# Patient Record
Sex: FEMALE | Race: WHITE | NOT HISPANIC OR LATINO | Employment: UNEMPLOYED | URBAN - METROPOLITAN AREA
[De-identification: names, ages, dates, MRNs, and addresses within clinical notes are randomized per-mention and may not be internally consistent; named-entity substitution may affect disease eponyms.]

---

## 2022-11-21 ENCOUNTER — OFFICE VISIT (OUTPATIENT)
Dept: FAMILY MEDICINE CLINIC | Facility: CLINIC | Age: 22
End: 2022-11-21

## 2022-11-21 ENCOUNTER — PATIENT ROUNDING (BHMG ONLY) (OUTPATIENT)
Dept: FAMILY MEDICINE CLINIC | Facility: CLINIC | Age: 22
End: 2022-11-21

## 2022-11-21 VITALS
DIASTOLIC BLOOD PRESSURE: 72 MMHG | WEIGHT: 164.2 LBS | BODY MASS INDEX: 28.03 KG/M2 | HEIGHT: 64 IN | SYSTOLIC BLOOD PRESSURE: 114 MMHG | OXYGEN SATURATION: 95 % | HEART RATE: 86 BPM

## 2022-11-21 DIAGNOSIS — H92.01 RIGHT EAR PAIN: ICD-10-CM

## 2022-11-21 DIAGNOSIS — J30.89 ENVIRONMENTAL AND SEASONAL ALLERGIES: Primary | ICD-10-CM

## 2022-11-21 PROCEDURE — 99203 OFFICE O/P NEW LOW 30 MIN: CPT | Performed by: NURSE PRACTITIONER

## 2022-11-21 RX ORDER — LORATADINE 10 MG/1
10 TABLET ORAL DAILY
Qty: 30 TABLET | Refills: 0 | Status: SHIPPED | OUTPATIENT
Start: 2022-11-21 | End: 2023-02-02

## 2022-11-21 RX ORDER — LORATADINE AND PSEUDOEPHEDRINE SULFATE 5; 120 MG/1; MG/1
1 TABLET, EXTENDED RELEASE ORAL 2 TIMES DAILY
Qty: 28 TABLET | Refills: 0 | Status: SHIPPED | OUTPATIENT
Start: 2022-11-21 | End: 2023-02-02

## 2022-11-21 NOTE — PROGRESS NOTES
"Chief Complaint  Earache (Pt states her left ear is huting her and she says it feels like a lot of pressure inside) and Labs Only    Subjective        Nataliia Lr presents to [facility]  History of Present Illness  Pt is here today to establish care. She reports she is having ear pain in left ear. She went to  last week and was advised there was fluid in her ear. Pt would also like to discuss general lab work. She is not fasting today. She will schedule annual physical and will come to [facility] fasting.     Objective   Vital Signs:  /72   Pulse 86   Ht 162.6 cm (64\")   Wt 74.5 kg (164 lb 3.2 oz)   SpO2 95%   BMI 28.18 kg/m²   Estimated body mass index is 28.18 kg/m² as calculated from the following:    Height as of this encounter: 162.6 cm (64\").    Weight as of this encounter: 74.5 kg (164 lb 3.2 oz).        Physical Exam  Vitals reviewed.   Constitutional:       Appearance: Normal appearance.   HENT:      Right Ear: Ear canal and external ear normal. A middle ear effusion is present.      Left Ear: Ear canal and external ear normal. A middle ear effusion is present.      Nose: Nose normal.      Mouth/Throat:      Mouth: Mucous membranes are moist.   Eyes:      Conjunctiva/sclera: Conjunctivae normal.   Cardiovascular:      Rate and Rhythm: Normal rate and regular rhythm.      Heart sounds: Normal heart sounds.   Pulmonary:      Effort: Pulmonary effort is normal.      Breath sounds: Normal breath sounds.   Musculoskeletal:         General: Normal range of motion.      Cervical back: Neck supple.   Skin:     General: Skin is warm.   Neurological:      Mental Status: She is alert and oriented to person, place, and time.   Psychiatric:         Mood and Affect: Mood normal.         Behavior: Behavior normal.         Thought Content: Thought content normal.        Result Review :                Assessment and Plan   Diagnoses and all orders for this visit:    1. Environmental " and seasonal allergies (Primary)  -     loratadine-pseudoephedrine (Claritin-D 12 Hour) 5-120 MG per 12 hr tablet; Take 1 tablet by mouth 2 (Two) Times a Day for 14 days.  Dispense: 28 tablet; Refill: 0  -     loratadine (Claritin) 10 MG tablet; Take 1 tablet by mouth Daily.  Dispense: 30 tablet; Refill: 0    2. Right ear pain     - Take Claritin-D for allergy relief and sinus/ear pressure. Do not take for extended period and do not take concurrently with claritin.        Follow Up   Return for Annual.  Patient was given instructions and counseling regarding her condition or for health maintenance advice. Please see specific information pulled into the AVS if appropriate.

## 2022-11-28 ENCOUNTER — LAB (OUTPATIENT)
Dept: LAB | Facility: HOSPITAL | Age: 22
End: 2022-11-28

## 2022-11-28 ENCOUNTER — OFFICE VISIT (OUTPATIENT)
Dept: FAMILY MEDICINE CLINIC | Facility: CLINIC | Age: 22
End: 2022-11-28

## 2022-11-28 VITALS
BODY MASS INDEX: 28.07 KG/M2 | SYSTOLIC BLOOD PRESSURE: 118 MMHG | DIASTOLIC BLOOD PRESSURE: 74 MMHG | WEIGHT: 164.4 LBS | OXYGEN SATURATION: 97 % | HEART RATE: 84 BPM | HEIGHT: 64 IN

## 2022-11-28 DIAGNOSIS — H92.02 OTALGIA OF LEFT EAR: ICD-10-CM

## 2022-11-28 DIAGNOSIS — Z86.39 HISTORY OF VITAMIN D DEFICIENCY: ICD-10-CM

## 2022-11-28 DIAGNOSIS — Z00.00 ANNUAL PHYSICAL EXAM: ICD-10-CM

## 2022-11-28 DIAGNOSIS — Z13.220 SCREENING FOR CHOLESTEROL LEVEL: ICD-10-CM

## 2022-11-28 DIAGNOSIS — Z13.29 SCREENING FOR THYROID DISORDER: ICD-10-CM

## 2022-11-28 DIAGNOSIS — Z00.00 ANNUAL PHYSICAL EXAM: Primary | ICD-10-CM

## 2022-11-28 DIAGNOSIS — Z13.0 SCREENING FOR DEFICIENCY ANEMIA: ICD-10-CM

## 2022-11-28 LAB
25(OH)D3 SERPL-MCNC: 28.4 NG/ML (ref 30–100)
ALBUMIN SERPL-MCNC: 4.5 G/DL (ref 3.5–5.2)
ALBUMIN/GLOB SERPL: 1.7 G/DL
ALP SERPL-CCNC: 54 U/L (ref 39–117)
ALT SERPL W P-5'-P-CCNC: 13 U/L (ref 1–33)
ANION GAP SERPL CALCULATED.3IONS-SCNC: 11.5 MMOL/L (ref 5–15)
AST SERPL-CCNC: 18 U/L (ref 1–32)
BASOPHILS # BLD AUTO: 0.03 10*3/MM3 (ref 0–0.2)
BASOPHILS NFR BLD AUTO: 0.5 % (ref 0–1.5)
BILIRUB SERPL-MCNC: 1.4 MG/DL (ref 0–1.2)
BUN SERPL-MCNC: 12 MG/DL (ref 6–20)
BUN/CREAT SERPL: 12.2 (ref 7–25)
CALCIUM SPEC-SCNC: 9.1 MG/DL (ref 8.6–10.5)
CHLORIDE SERPL-SCNC: 105 MMOL/L (ref 98–107)
CHOLEST SERPL-MCNC: 200 MG/DL (ref 0–200)
CO2 SERPL-SCNC: 25.5 MMOL/L (ref 22–29)
CREAT SERPL-MCNC: 0.98 MG/DL (ref 0.57–1)
DEPRECATED RDW RBC AUTO: 41.1 FL (ref 37–54)
EGFRCR SERPLBLD CKD-EPI 2021: 82.8 ML/MIN/1.73
EOSINOPHIL # BLD AUTO: 0.22 10*3/MM3 (ref 0–0.4)
EOSINOPHIL NFR BLD AUTO: 3.4 % (ref 0.3–6.2)
ERYTHROCYTE [DISTWIDTH] IN BLOOD BY AUTOMATED COUNT: 14.8 % (ref 12.3–15.4)
GLOBULIN UR ELPH-MCNC: 2.7 GM/DL
GLUCOSE SERPL-MCNC: 92 MG/DL (ref 65–99)
HCT VFR BLD AUTO: 36.9 % (ref 34–46.6)
HDLC SERPL-MCNC: 45 MG/DL (ref 40–60)
HGB BLD-MCNC: 11.4 G/DL (ref 12–15.9)
IMM GRANULOCYTES # BLD AUTO: 0.01 10*3/MM3 (ref 0–0.05)
IMM GRANULOCYTES NFR BLD AUTO: 0.2 % (ref 0–0.5)
LDLC SERPL CALC-MCNC: 138 MG/DL (ref 0–100)
LDLC/HDLC SERPL: 3.04 {RATIO}
LYMPHOCYTES # BLD AUTO: 1.98 10*3/MM3 (ref 0.7–3.1)
LYMPHOCYTES NFR BLD AUTO: 30.7 % (ref 19.6–45.3)
MCH RBC QN AUTO: 24 PG (ref 26.6–33)
MCHC RBC AUTO-ENTMCNC: 30.9 G/DL (ref 31.5–35.7)
MCV RBC AUTO: 77.7 FL (ref 79–97)
MONOCYTES # BLD AUTO: 0.7 10*3/MM3 (ref 0.1–0.9)
MONOCYTES NFR BLD AUTO: 10.9 % (ref 5–12)
NEUTROPHILS NFR BLD AUTO: 3.5 10*3/MM3 (ref 1.7–7)
NEUTROPHILS NFR BLD AUTO: 54.3 % (ref 42.7–76)
NRBC BLD AUTO-RTO: 0 /100 WBC (ref 0–0.2)
PLATELET # BLD AUTO: 189 10*3/MM3 (ref 140–450)
PMV BLD AUTO: 13.1 FL (ref 6–12)
POTASSIUM SERPL-SCNC: 4 MMOL/L (ref 3.5–5.2)
PROT SERPL-MCNC: 7.2 G/DL (ref 6–8.5)
RBC # BLD AUTO: 4.75 10*6/MM3 (ref 3.77–5.28)
SODIUM SERPL-SCNC: 142 MMOL/L (ref 136–145)
TRIGL SERPL-MCNC: 91 MG/DL (ref 0–150)
TSH SERPL DL<=0.05 MIU/L-ACNC: 2.9 UIU/ML (ref 0.27–4.2)
VLDLC SERPL-MCNC: 17 MG/DL (ref 5–40)
WBC NRBC COR # BLD: 6.44 10*3/MM3 (ref 3.4–10.8)

## 2022-11-28 PROCEDURE — 82306 VITAMIN D 25 HYDROXY: CPT

## 2022-11-28 PROCEDURE — 80050 GENERAL HEALTH PANEL: CPT

## 2022-11-28 PROCEDURE — 80061 LIPID PANEL: CPT

## 2022-11-28 PROCEDURE — 99395 PREV VISIT EST AGE 18-39: CPT | Performed by: NURSE PRACTITIONER

## 2022-11-28 PROCEDURE — 3008F BODY MASS INDEX DOCD: CPT | Performed by: NURSE PRACTITIONER

## 2022-11-28 PROCEDURE — 2014F MENTAL STATUS ASSESS: CPT | Performed by: NURSE PRACTITIONER

## 2022-11-28 NOTE — PROGRESS NOTES
"Chief Complaint  Annual Exam and Earache (Pt states her left ear is still hurting)    Subjective          Nataliia Lr presents to [facility] for   History of Present Illness  Pt is here today for annual physical. She has been dealing with ear pain for about 2-3 weeks. Initially we discussed a decongestant due to fluid pressure in the ears. Pt has been taking Claritin-D since last appt and has had almost no relief. She has also noticed decreased hearing in the left ear. Referring to ENT for evaluation.    Discussed immunizations. Pt declines flu vaccine. Pt thinks she had HPV vaccines as a teenager, but not sure. She is also not sure of last td. Will request immunization records from previous provider. She has never had a pap. She will contact a GYN and make an appointment to be seen. She is fasting today; will do yearly screening labs.     Earache   There is pain in the left ear. This is a new problem. The current episode started 1 to 4 weeks ago. The problem occurs constantly. The problem has been unchanged. There has been no fever. She has tried nothing (Claritin-D) for the symptoms. Her past medical history is significant for hearing loss. There is no history of a chronic ear infection or a tympanostomy tube.     Objective   Vital Signs:   Vitals:    11/28/22 0840   BP: 118/74   Pulse: 84   SpO2: 97%   Weight: 74.6 kg (164 lb 6.4 oz)   Height: 162.6 cm (64.02\")     Body mass index is 28.21 kg/m².    BMI is >= 25 and <30. (Overweight) The following options were offered after discussion;: exercise counseling/recommendations and nutrition counseling/recommendations    Physical Exam  Vitals reviewed.   Constitutional:       Appearance: Normal appearance.   HENT:      Right Ear: Ear canal and external ear normal. A middle ear effusion is present.      Left Ear: Ear canal and external ear normal. A middle ear effusion is present.      Nose: Nose normal.      Mouth/Throat:      Mouth: " Mucous membranes are moist.      Pharynx: Oropharynx is clear.   Eyes:      Conjunctiva/sclera: Conjunctivae normal.   Cardiovascular:      Rate and Rhythm: Regular rhythm.      Pulses: Normal pulses.      Heart sounds: Normal heart sounds.   Pulmonary:      Effort: Pulmonary effort is normal.      Breath sounds: Normal breath sounds.   Abdominal:      General: Abdomen is flat. Bowel sounds are normal.      Palpations: Abdomen is soft.      Tenderness: There is no abdominal tenderness. There is no guarding.   Musculoskeletal:         General: No tenderness. Normal range of motion.      Cervical back: Normal range of motion and neck supple.   Skin:     General: Skin is warm.   Neurological:      Mental Status: She is alert and oriented to person, place, and time.   Psychiatric:         Mood and Affect: Mood normal.         Behavior: Behavior normal.         Thought Content: Thought content normal.        Result Review :                  There is no immunization history on file for this patient.    Health Maintenance   Topic Date Due   • TDAP/TD VACCINES (1 - Tdap) Never done   • INFLUENZA VACCINE  Never done   • PAP SMEAR  Never done        Assessment and Plan    Diagnoses and all orders for this visit:    1. Annual physical exam (Primary)  -     Comprehensive Metabolic Panel; Future    2. Otalgia of left ear  -     Ambulatory Referral to ENT (Otolaryngology)    3. Screening for deficiency anemia  -     CBC Auto Differential; Future    4. History of vitamin D deficiency  -     Vitamin D,25-Hydroxy; Future    5. Screening for thyroid disorder  -     TSH Rfx On Abnormal To Free T4; Future    6. Screening for cholesterol level  -     Lipid Panel; Future        Counseling/anticipatory guidance: Nutrition, physical activity, healthy weight, dental health, mental health, eye exam, immunizations, screenings    Pt gets routine dental/vision exams. Discussed diet and exercise for healthy BMI.     Pt does not wish to get  vaccinations today. Discussed flu, covid, HPV, and td.       Follow Up   No follow-ups on file.  Patient was given instructions and counseling regarding her condition or for health maintenance advice. Please see specific information pulled into the AVS if appropriate.

## 2023-02-02 ENCOUNTER — OFFICE VISIT (OUTPATIENT)
Dept: FAMILY MEDICINE CLINIC | Facility: CLINIC | Age: 23
End: 2023-02-02
Payer: COMMERCIAL

## 2023-02-02 VITALS
WEIGHT: 163 LBS | DIASTOLIC BLOOD PRESSURE: 72 MMHG | HEIGHT: 64 IN | BODY MASS INDEX: 27.83 KG/M2 | SYSTOLIC BLOOD PRESSURE: 102 MMHG

## 2023-02-02 DIAGNOSIS — R22.32 MASS OF AXILLA, LEFT: Primary | ICD-10-CM

## 2023-02-02 PROCEDURE — 99213 OFFICE O/P EST LOW 20 MIN: CPT | Performed by: NURSE PRACTITIONER

## 2023-02-02 NOTE — PROGRESS NOTES
"Subjective   Nataliia Lr is a 23 y.o. female.   Chief Complaint   Patient presents with   • Mass     Left armpit       History of Present Illness   Patient of [facility] is here with complaint of mass under left arm, has been there for 4 years or so, not painful, but has become more concerned because her mother was recently diagnosed with breast cancer. States she does not like how it looks when she has sleeveless tops on  The following portions of the patient's history were reviewed and updated as appropriate: allergies, current medications, past family history, past medical history, past social history, past surgical history and problem list.    Review of Systems    Objective   Physical Exam  Vitals reviewed.   Constitutional:       General: She is not in acute distress.     Appearance: Normal appearance. She is not ill-appearing, toxic-appearing or diaphoretic.   HENT:      Head: Normocephalic and atraumatic.   Neck:      Vascular: No carotid bruit.   Cardiovascular:      Rate and Rhythm: Normal rate and regular rhythm.   Pulmonary:      Effort: Pulmonary effort is normal. No respiratory distress.      Breath sounds: Normal breath sounds.   Chest:   Breasts:     Right: No tenderness.      Left: No tenderness.       Lymphadenopathy:      Upper Body:      Right upper body: No supraclavicular, axillary or pectoral adenopathy.      Left upper body: No supraclavicular, axillary or pectoral adenopathy.   Neurological:      Mental Status: She is alert and oriented to person, place, and time.   Psychiatric:         Mood and Affect: Mood normal.         Thought Content: Thought content normal.       /72 (BP Location: Left arm, Patient Position: Sitting, Cuff Size: Adult)   Ht 162.6 cm (64.02\")   Wt 73.9 kg (163 lb)   BMI 27.96 kg/m²     Assessment & Plan   Diagnoses and all orders for this visit:    1. Mass of axilla, left (Primary)  -     US Axilla Left; Future        US of left axilla ordered; if lipoma, " can refer to surgeon to discuss removal if that is patient's wish. She is also aware that she may have to get a mammogram for further evaluation  Patient was encouraged to keep me informed of any acute changes, lack of improvement, or any new concerning symptoms.

## 2023-02-13 ENCOUNTER — HOSPITAL ENCOUNTER (OUTPATIENT)
Dept: ULTRASOUND IMAGING | Facility: HOSPITAL | Age: 23
Discharge: HOME | End: 2023-02-13
Admitting: NURSE PRACTITIONER
Payer: COMMERCIAL

## 2023-02-13 DIAGNOSIS — R22.32 MASS OF AXILLA, LEFT: ICD-10-CM

## 2023-02-13 PROCEDURE — 76882 US LMTD JT/FCL EVL NVASC XTR: CPT

## 2023-03-28 ENCOUNTER — OFFICE VISIT (OUTPATIENT)
Dept: FAMILY MEDICINE CLINIC | Facility: CLINIC | Age: 23
End: 2023-03-28
Payer: COMMERCIAL

## 2023-03-28 VITALS
WEIGHT: 166 LBS | SYSTOLIC BLOOD PRESSURE: 104 MMHG | DIASTOLIC BLOOD PRESSURE: 80 MMHG | HEIGHT: 64 IN | HEART RATE: 87 BPM | BODY MASS INDEX: 28.34 KG/M2 | OXYGEN SATURATION: 100 %

## 2023-03-28 DIAGNOSIS — N92.6 MENSTRUAL ABNORMALITY: Primary | ICD-10-CM

## 2023-03-28 LAB
B-HCG UR QL: NEGATIVE
EXPIRATION DATE: NORMAL
INTERNAL NEGATIVE CONTROL: NORMAL
INTERNAL POSITIVE CONTROL: NORMAL
Lab: NORMAL

## 2023-03-28 RX ORDER — MELOXICAM 15 MG/1
15 TABLET ORAL DAILY
Qty: 30 TABLET | Refills: 11 | COMMUNITY
Start: 2023-03-01 | End: 2023-03-28

## 2023-03-28 NOTE — PROGRESS NOTES
"Chief Complaint  Menstrual Problem (Went to restroom earlier had a  V shape blood clot with 2 clear spots and a string attached to it. Pain yesterday only. Late 1 week on her period. 2 weeks ago had iron drawn and it was @ 6)    Subjective        Nataliia Lr presents to [facility]  History of Present Illness  Pt is here today for concerns of abnormal menstruation. She started her period a week late; 1st day of menstruation was yesterday. Today she passed a large clot. Due to size and appearance of clot, she is concerned that she may have had a miscarriage. Urine pregnancy test today is negative. Will refer to GYN for evaluation. She reports she commonly experiences vomiting and severe cramping with menstruation, but passing a clot is new. Advised her to schedule with GYN; however, if any other symptoms or concerns arise she should return here for evaluation if it is prior to her appt with GYN.    Objective   Vital Signs:  /80   Pulse 87   Ht 162.6 cm (64.02\")   Wt 75.3 kg (166 lb)   SpO2 100%   BMI 28.48 kg/m²   Estimated body mass index is 28.48 kg/m² as calculated from the following:    Height as of this encounter: 162.6 cm (64.02\").    Weight as of this encounter: 75.3 kg (166 lb).           Physical Exam  Vitals reviewed.   Constitutional:       Appearance: Normal appearance.   HENT:      Nose: Nose normal.      Mouth/Throat:      Mouth: Mucous membranes are moist.      Pharynx: Oropharynx is clear.   Eyes:      Conjunctiva/sclera: Conjunctivae normal.   Cardiovascular:      Rate and Rhythm: Normal rate and regular rhythm.      Heart sounds: Normal heart sounds.   Pulmonary:      Effort: Pulmonary effort is normal.      Breath sounds: Normal breath sounds.   Musculoskeletal:         General: Normal range of motion.      Cervical back: Normal range of motion.   Skin:     General: Skin is warm.   Neurological:      Mental Status: She is alert and oriented to person, " place, and time.   Psychiatric:         Mood and Affect: Mood normal.         Behavior: Behavior normal.         Thought Content: Thought content normal.        Result Review :                 Assessment and Plan   Diagnoses and all orders for this visit:    1. Menstrual abnormality (Primary)  -     POCT pregnancy, urine  -     Ambulatory Referral to Obstetrics / Gynecology     - Midol, Ibuprofen Tylenol for menstrual cramps.         Follow Up   No follow-ups on file.  Patient was given instructions and counseling regarding her condition or for health maintenance advice. Please see specific information pulled into the AVS if appropriate.

## 2023-04-14 ENCOUNTER — OFFICE VISIT (OUTPATIENT)
Dept: FAMILY MEDICINE CLINIC | Facility: CLINIC | Age: 23
End: 2023-04-14
Payer: COMMERCIAL

## 2023-04-14 VITALS
WEIGHT: 163 LBS | DIASTOLIC BLOOD PRESSURE: 74 MMHG | BODY MASS INDEX: 27.83 KG/M2 | HEART RATE: 57 BPM | OXYGEN SATURATION: 98 % | HEIGHT: 64 IN | SYSTOLIC BLOOD PRESSURE: 110 MMHG

## 2023-04-14 DIAGNOSIS — L23.9 ALLERGIC DERMATITIS: ICD-10-CM

## 2023-04-14 DIAGNOSIS — R21 RASH: Primary | ICD-10-CM

## 2023-04-14 RX ORDER — METHYLPREDNISOLONE 4 MG/1
TABLET ORAL
Qty: 21 TABLET | Refills: 0 | Status: SHIPPED | OUTPATIENT
Start: 2023-04-14

## 2023-04-14 NOTE — PROGRESS NOTES
"Chief Complaint  Eczema (Arms and hands x1 week using cool cloths to help)    Subjective        Nataliia Lr presents to [facility]  History of Present Illness  Pt is here today with rash on hands and inner elbows. She has been using lotion and ice to help alleviate symptoms. She has been outside some over the last week. She states she gets rashes like this yearly about this time of year. Rash is itchy.       Objective   Vital Signs:  /74   Pulse 57   Ht 162.6 cm (64.02\")   Wt 73.9 kg (163 lb)   SpO2 98%   BMI 27.96 kg/m²   Estimated body mass index is 27.96 kg/m² as calculated from the following:    Height as of this encounter: 162.6 cm (64.02\").    Weight as of this encounter: 73.9 kg (163 lb).             Physical Exam  Vitals reviewed.   Constitutional:       Appearance: Normal appearance.   HENT:      Nose: Nose normal.      Mouth/Throat:      Mouth: Mucous membranes are moist.      Pharynx: Oropharynx is clear.   Eyes:      Conjunctiva/sclera: Conjunctivae normal.   Cardiovascular:      Rate and Rhythm: Normal rate and regular rhythm.      Heart sounds: Normal heart sounds.   Pulmonary:      Effort: Pulmonary effort is normal.      Breath sounds: Normal breath sounds.   Musculoskeletal:         General: Normal range of motion.      Cervical back: Normal range of motion.   Skin:     Findings: Rash present. Rash is vesicular.      Comments: Vesicular rash present on both hands and inner elbows.    Neurological:      Mental Status: She is alert and oriented to person, place, and time.   Psychiatric:         Mood and Affect: Mood normal.         Behavior: Behavior normal.         Thought Content: Thought content normal.        Result Review :                   Assessment and Plan   Diagnoses and all orders for this visit:    1. Rash (Primary)  -     methylPREDNISolone (MEDROL) 4 MG dose pack; Take as directed on package instructions.  Dispense: 21 tablet; Refill: 0  -   "   triamcinolone (KENALOG) 0.1 % ointment; Apply 1 application topically to the appropriate area as directed 2 (Two) Times a Day.  Dispense: 15 g; Refill: 0    2. Allergic dermatitis  -     methylPREDNISolone (MEDROL) 4 MG dose pack; Take as directed on package instructions.  Dispense: 21 tablet; Refill: 0  -     triamcinolone (KENALOG) 0.1 % ointment; Apply 1 application topically to the appropriate area as directed 2 (Two) Times a Day.  Dispense: 15 g; Refill: 0             Follow Up   No follow-ups on file.  Patient was given instructions and counseling regarding her condition or for health maintenance advice. Please see specific information pulled into the AVS if appropriate.

## 2023-05-24 ENCOUNTER — TELEPHONE (OUTPATIENT)
Dept: FAMILY MEDICINE CLINIC | Facility: CLINIC | Age: 23
End: 2023-05-24
Payer: COMMERCIAL

## 2023-05-24 NOTE — TELEPHONE ENCOUNTER
Caller: Nataliia Lr    Relationship: Self    Best call back number: [phone]    What is the medical concern/diagnosis: PATIENT HAS A CYST     What specialty or service is being requested: OBGYN     Any additional details: PATIENT DOES NOT KNOW WHO SHE WOULD LIKE TO SEE BUT SHE WOULD LIKE THEM TO BE WITH [facility] AND DOES NOT WANT A NURSE PRACTITIONER.

## 2023-05-24 NOTE — TELEPHONE ENCOUNTER
Pt notified that referral was placed back in March for  OBGYN [phone] and she can call to schedule. Pt states they didn't have anything until October. Pt advised to call the  OB office, # given.

## 2023-06-08 PROBLEM — N83.8 RIGHT TUBO-OVARIAN MASS: Status: ACTIVE | Noted: 2023-06-08

## 2023-06-08 NOTE — PROGRESS NOTES
Nataliia Lr  [id number]  2000      Reason for visit: Complex right adnexal mass, abnormal Pap smear    Consultation:  Patient is being seen at the request of Dr. Martin Frank    History of present illness:  The patient is a 23 y.o. year old female who presents today for treatment and evaluation of the above issues.    Patient was noted to have a large complex right ovarian mass measuring 9.1 x 7.1 x 8.3 cm on transvaginal ultrasound performed 2023.  There is a hyperechoic mass within the right ovary.  MRI 2023 confirmed right ovarian mass.  Tumor markers showed a  of 61, CA 19-9 of 51, and an OVA1 of 5.5.  Patient was seen by Dr. Martin Frank and right salpingo-oophorectomy was recommended.  Patient presents today for another opinion regarding surgical options.  Today, mother accompanies patient.  They have multiple questions regarding next best steps.On my review of the ultrasound images performed 2023, right ovary is 7.05 x 8.29 cm with internal fairly uniform structures suggestive of hemorrhage.  I think this could represent a hemorrhagic cyst or a an endometrioma.  For new patients, [facility] intake form from today was reviewed and confirmed.    OBGYN History:  She is a .  She does have a history of abnormal pap smears.  Although I do not have these results, she is able to pull it off on her phone for me and her Pap smear result is ASCUS, high risk HPV.  I cannot tell the HPV subtypes from the information on her phone.    Oncologic History:  Oncology/Hematology History    No history exists.         Past Medical History:   Diagnosis Date    Abnormal Pap smear of cervix        No past surgical history on file.    MEDICATIONS: The current medication list was reviewed with the patient and updated in the EMR this date per the Medical Assistant. Medication dosages and frequencies were confirmed to be accurate.      Allergies:  has No Known Allergies.    Social History:   Social History  "    Socioeconomic History    Marital status:    Tobacco Use    Smoking status: Never    Smokeless tobacco: Never   Substance and Sexual Activity    Alcohol use: Never    Drug use: Never       Family History:    Family History   Problem Relation Age of Onset    Breast cancer Mother 47       Health Maintenance:    Health Maintenance   Topic Date Due    COVID-19 Vaccine (1) Never done    HPV VACCINES (1 - 2-dose series) Never done    TDAP/TD VACCINES (1 - Tdap) Never done    HEPATITIS C SCREENING  Never done    PAP SMEAR  Never done    INFLUENZA VACCINE  08/01/2023    ANNUAL PHYSICAL  11/28/2023    Pneumococcal Vaccine 0-64  Aged Out       Review of Systems  Patient is asymptomatic from her right ovarian cyst.  Please refer to history of present illness for further review of systems.  Physical examination  Vitals:    06/09/23 0831   BP: 111/56   Pulse: 88   Resp: 20   Temp: 97.6 °F (36.4 °C)   TempSrc: Temporal   SpO2: 99%   Weight: 72.8 kg (160 lb 8 oz)   Height: 162.6 cm (64\")   PainSc: 0-No pain       Body mass index is 27.55 kg/m².  Wt Readings from Last 3 Encounters:   06/09/23 72.8 kg (160 lb 8 oz)   04/14/23 73.9 kg (163 lb)   03/28/23 75.3 kg (166 lb)       GENERAL: Alert, well-appearing female appearing her stated age who is in no apparent distress.   HEENT: Sclera anicteric. Head normocephalic, atraumatic. Mucus membranes moist.   NECK: Trachea midline, supple, without masses.  No thyromegaly.   BREASTS: Deferred  CARDIOVASCULAR: Normal rate, regular rhythm, no murmurs, rubs, or gallops.  No peripheral edema.  RESPIRATORY: Clear to auscultation bilaterally, normal respiratory effort  BACK:  No CVA tenderness, no vertebral tenderness on palpation  GASTROINTESTINAL:  Abdomen is soft, non-tender, non-distended, no rebound or guarding, or hernias. No HSM.  Fullness at right lower quadrant which is infraumbilical.  SKIN:  Warm, dry, well-perfused.  All visible areas intact.  No rashes, lesions, " ulcers.  PSYCHIATRIC: AO x3, with appropriate affect, normal thought processes.  NEUROLOGIC: No focal deficits.  Moves extremities well.  MUSCULOSKELETAL: Normal gait and station.   EXTREMITIES:   No cyanosis, clubbing, symmetric.  LYMPHATICS:  No cervical or inguinal adenopathy noted.     PELVIC exam:    External genitalia are free from lesion.  Please refer to colposcopy for cervical examination.  On bimanual examination, fullness at the right lower quadrant was appreciated.  No mass was well palpated in the pelvis.  Mass was better appreciated with abdominal examining hand..  Uterus was normal in size and shape. There is no cervical motion or uterine tenderness. No cervical mass was palpated. Parametria were smooth. Rectovaginal exam was deferred.     ECOG PS 0    PROCEDURES:  After obtaining informed consent, colposcopy was performed of cervix  3% acetic acid was applied.  Colposcopy was adequate.  Findings were remarkable for acetowhite changes at 12, 5, and 7:00..  Biopsy was performed of the 12:00 and 7:00 areas.  Adequate hemostasis was noted at the end of the procedure.  Procedure was well-tolerated.  Physical Exam  Genitourinary:      Genitourinary Comments: Mosaicism focally at 12:00 with acetowhite change noted in other areas as detailed above.               Diagnostic Data:    See HPI    Lab Results   Component Value Date    WBC 6.44 11/28/2022    HGB 11.4 (L) 11/28/2022    HCT 36.9 11/28/2022    MCV 77.7 (L) 11/28/2022     11/28/2022    NEUTROABS 3.50 11/28/2022    GLUCOSE 92 11/28/2022    BUN 12 11/28/2022    CREATININE 0.98 11/28/2022     11/28/2022    K 4.0 11/28/2022     11/28/2022    CO2 25.5 11/28/2022    CALCIUM 9.1 11/28/2022    ALBUMIN 4.50 11/28/2022    AST 18 11/28/2022    ALT 13 11/28/2022    BILITOT 1.4 (H) 11/28/2022     No results found for:       Assessment & Plan   This is a 23 y.o. woman with a right adnexal mass as detailed in HPI.  Possible endometriosis  versus hemorrhagic cyst.  Abnormal Pap smear.  Encounter Diagnoses   Name Primary?    Right tubo-ovarian mass Yes    ASCUS with positive high risk HPV cervical      We will wait for colposcopy biopsy results prior to scheduling surgery.  This would be in the event that the patient needs cervical conization.  We discussed risks and benefits associated with rupture of ovarian mass at the time of surgery.  I will order additional tumor markers with preoperative labs in an abundance of caution.    Patient was consented for robotic assisted right cystectomy versus right salpingo-oophorectomy, possible cancer staging.  Depending on biopsy results, cervical conization would be performed at the same time.  Plan is for frozen section with cancer staging if clinically indicated at the time of surgery.    Risks and benefits of surgery were discussed.  This included, but was not limited to, infection and bleeding like when the skin is cut; damage to surrounding structures; and incisional complications.  Risk of DVT was addressed for major surgeries.  Standard of care efforts to minimize these risks were reviewed.  Typical hospital stay and recovery were discussed as well as post-procedure precautions.  Surgical implications of chronic illnesses on recovery and surgical outcome were reviewed.   Pain medication regimen for postoperative care was discussed.  Typical regimen and avoidance of narcotics was discussed.  Patient was educated that other factors, such as existing narcotic use, can impact postoperative pain management.    Patient verbalized understanding of the plan including the risks and benefits.  Appropriate perioperative testing including laboratory evaluation, EKG as clinically indicated, chest x-ray as clinically indicated, and preadmission evaluation were all ordered as a part of this patient's care.    Risks of cervical conization was discussed as well.  We discussed the possibility of premature labor and   delivery as well as need for subsequent conization as risks.  Patient understands the goal would be to prevent progression to high-grade dysplasia.  Diagrams were drawn to explain Pap smears, dysplasia, dysplasia progression, and cancer prevention.    Pain assessment was performed today as a part of patient’s care.  For patients with pain related to surgery, gynecologic malignancy or cancer treatment, the plan is as noted in the assessment/plan.  For patients with pain not related to these issues, they are to seek any further needed care from a more appropriate provider, such as PCP.      No orders of the defined types were placed in this encounter.      FOLLOW UP: will contact patient when biopsy results are back    I spent 45 minutes caring for Nataliia on this date of service. This time includes time spent by me in the following activities: preparing for the visit, reviewing tests, performing a medically appropriate examination and/or evaluation, counseling and educating the patient/family/caregiver, ordering medications, tests, or procedures, referring and communicating with other health care professionals, documenting information in the medical record, and independently interpreting results and communicating that information with the patient/family/caregiver  I spent 7 minutes on the separately reported service of colposcopy with directed biopsy. This time is not included in the time used to support the E/M service also reported today.    Electronically Signed by: Tejal Hector MD  Date: 6/11/2023

## 2023-06-09 ENCOUNTER — PATIENT ROUNDING (BHMG ONLY) (OUTPATIENT)
Dept: GYNECOLOGIC ONCOLOGY | Facility: CLINIC | Age: 23
End: 2023-06-09
Payer: COMMERCIAL

## 2023-06-09 ENCOUNTER — OFFICE VISIT (OUTPATIENT)
Dept: GYNECOLOGIC ONCOLOGY | Facility: CLINIC | Age: 23
End: 2023-06-09
Payer: COMMERCIAL

## 2023-06-09 VITALS
HEART RATE: 88 BPM | TEMPERATURE: 97.6 F | RESPIRATION RATE: 20 BRPM | SYSTOLIC BLOOD PRESSURE: 111 MMHG | BODY MASS INDEX: 27.4 KG/M2 | HEIGHT: 64 IN | WEIGHT: 160.5 LBS | DIASTOLIC BLOOD PRESSURE: 56 MMHG | OXYGEN SATURATION: 99 %

## 2023-06-09 DIAGNOSIS — R87.810 ASCUS WITH POSITIVE HIGH RISK HPV CERVICAL: ICD-10-CM

## 2023-06-09 DIAGNOSIS — R87.610 ASCUS WITH POSITIVE HIGH RISK HPV CERVICAL: ICD-10-CM

## 2023-06-09 DIAGNOSIS — N83.8 RIGHT TUBO-OVARIAN MASS: Primary | ICD-10-CM

## 2023-06-09 PROCEDURE — 99204 OFFICE O/P NEW MOD 45 MIN: CPT | Performed by: OBSTETRICS & GYNECOLOGY

## 2023-06-09 PROCEDURE — 1159F MED LIST DOCD IN RCRD: CPT | Performed by: OBSTETRICS & GYNECOLOGY

## 2023-06-09 PROCEDURE — 1126F AMNT PAIN NOTED NONE PRSNT: CPT | Performed by: OBSTETRICS & GYNECOLOGY

## 2023-06-09 PROCEDURE — 1160F RVW MEDS BY RX/DR IN RCRD: CPT | Performed by: OBSTETRICS & GYNECOLOGY

## 2023-06-09 PROCEDURE — 88305 TISSUE EXAM BY PATHOLOGIST: CPT | Performed by: OBSTETRICS & GYNECOLOGY

## 2023-06-09 RX ORDER — FERROUS SULFATE TAB EC 324 MG (65 MG FE EQUIVALENT) 324 (65 FE) MG
324 TABLET DELAYED RESPONSE ORAL
COMMUNITY
End: 2023-06-15

## 2023-06-11 PROBLEM — R87.610 ASCUS WITH POSITIVE HIGH RISK HPV CERVICAL: Status: ACTIVE | Noted: 2023-06-11

## 2023-06-11 PROBLEM — R87.810 ASCUS WITH POSITIVE HIGH RISK HPV CERVICAL: Status: ACTIVE | Noted: 2023-06-11

## 2023-06-13 ENCOUNTER — TELEPHONE (OUTPATIENT)
Dept: GYNECOLOGIC ONCOLOGY | Facility: CLINIC | Age: 23
End: 2023-06-13
Payer: COMMERCIAL

## 2023-06-13 DIAGNOSIS — N83.8 RIGHT TUBO-OVARIAN MASS: Primary | ICD-10-CM

## 2023-06-13 LAB
CYTO UR: NORMAL
LAB AP CASE REPORT: NORMAL
LAB AP CLINICAL INFORMATION: NORMAL
PATH REPORT.FINAL DX SPEC: NORMAL
PATH REPORT.GROSS SPEC: NORMAL

## 2023-06-13 RX ORDER — CELECOXIB 100 MG/1
200 CAPSULE ORAL ONCE
OUTPATIENT
Start: 2023-06-13 | End: 2023-06-13

## 2023-06-13 RX ORDER — ACETAMINOPHEN 500 MG
1000 TABLET ORAL ONCE
OUTPATIENT
Start: 2023-06-13 | End: 2023-06-13

## 2023-06-13 RX ORDER — SCOLOPAMINE TRANSDERMAL SYSTEM 1 MG/1
1 PATCH, EXTENDED RELEASE TRANSDERMAL CONTINUOUS
OUTPATIENT
Start: 2023-06-13 | End: 2023-06-16

## 2023-06-13 RX ORDER — PREGABALIN 75 MG/1
150 CAPSULE ORAL ONCE
OUTPATIENT
Start: 2023-06-13 | End: 2023-06-13

## 2023-06-13 RX ORDER — OXYCODONE HYDROCHLORIDE 5 MG/1
5 TABLET ORAL ONCE
OUTPATIENT
Start: 2023-06-13 | End: 2023-06-13

## 2023-06-13 NOTE — TELEPHONE ENCOUNTER
I called pt to discuss cervical biopsy results.     Patient with questions.    Will cyst come back?    Since there is an unclear etiology of the cyst, it is possible the cyst could return.  Was plan B a part of this cyst forming?    No    Patient informed that additional tumor markers will be obtained prior to surgery.  We will move towards scheduling surgery.

## 2023-06-13 NOTE — TELEPHONE ENCOUNTER
RN called patient back regarding her questions. Patient joined the call with her father. They requested to speak with Dr. Hector, RN let them know that she was not available at the moment but she could send her a message and let her know to call them back. Patient's father questioned when it would be, because he is out of the country for a couple of days starting tomorrow. RN told them she could hopefully call them back this evening, but she cannot 100% guarantee that. Patient and father verbalized understanding, said they could delay surgery until questions are answered if necessary.

## 2023-06-13 NOTE — TELEPHONE ENCOUNTER
Caller: Nataliia Lr    Relationship: Self    Best call back number: [phone]     What is the best time to reach you: USUALLY ANYTIME BEFORE 3 PM, BUT IT IS OKAY TO CALL AFTER 3 TODAY    Who are you requesting to speak with (clinical staff, provider,  specific staff member): CLINICAL - [place]    What was the call regarding: PT HAD MORE QUESTIONS ABOUT HER ONGOING MEDICAL ISSUES.     PLEASE CALL PT BACK TO DISCUSS

## 2023-06-13 NOTE — TELEPHONE ENCOUNTER
FATHER , LAUREN POP CALLED TO TALKED TO DR PORTER ABOUT DAUGHTERS SURGERY.     COULD YOU CALL HIM BACK [phone]

## 2023-06-19 ENCOUNTER — TELEPHONE (OUTPATIENT)
Dept: GYNECOLOGIC ONCOLOGY | Facility: CLINIC | Age: 23
End: 2023-06-19
Payer: COMMERCIAL

## 2023-06-19 NOTE — TELEPHONE ENCOUNTER
RN called patient back and discussed pre-surgery questions. Pt verbalized understanding. Pt encouraged to call office back if she had any future concerns.

## 2023-06-19 NOTE — TELEPHONE ENCOUNTER
Caller: Nataliia Lr    Relationship: Self    Best call back number: [phone]     What is the best time to reach you: ASAP    Who are you requesting to speak with (clinical staff, provider,  specific staff member): CLINICAL    What was the call regarding: PT WANTS TO KNOW IF THE BLOODWORK THAT DR PORTER WANTS HER TO COMPLETE NEEDS TO BE DONE PRIOR TO HER PROCEDURE ON JUNE 29?    Is it okay if the provider responds through MyChart: NO

## 2023-07-05 PROBLEM — I82.462 ACUTE DEEP VEIN THROMBOSIS (DVT) OF CALF MUSCLE VEIN OF LEFT LOWER EXTREMITY: Status: ACTIVE | Noted: 2023-07-05

## 2023-07-21 PROBLEM — Z98.890 POST-OPERATIVE STATE: Status: ACTIVE | Noted: 2023-07-21

## 2023-07-24 DIAGNOSIS — I82.462 ACUTE DEEP VEIN THROMBOSIS (DVT) OF CALF MUSCLE VEIN OF LEFT LOWER EXTREMITY: Primary | ICD-10-CM

## 2023-07-24 NOTE — TELEPHONE ENCOUNTER
Caller: Nataliia Lr    Relationship: Self    Best call back number:      Requested Prescriptions:   Requested Prescriptions     Pending Prescriptions Disp Refills    Apixaban Starter Pack tablet therapy pack 74 tablet 0     Sig: Take two 5 mg tablets by mouth every 12 hours for 7 days. Followed by one 5 mg tablet every 12 hours. (Dispense starter pack if available)        Pharmacy where request should be sent: [id number] [place] [id number]  AT [facility] [phone] [facility] [phone]      Last office visit with prescribing clinician: 7/5/2023   Last telemedicine visit with prescribing clinician: Visit date not found   Next office visit with prescribing clinician: 1/9/2024     Additional details provided by patient:  PATIENT HAS A WEEK LEFT BUT SOME PHARMACIES ARE OUT OF THE MEDICATION; ADVISED TO CHECK WITH THE PHARMACY TO SEE IF IT IS IN SUPPLY    Does the patient have less than a 3 day supply:  [] Yes  [x] No    Would you like a call back once the refill request has been completed: [] Yes [] No    If the office needs to give you a call back, can they leave a voicemail: [] Yes [] No    Evan Beasley Rep   07/24/23 09:59 EDT

## 2023-07-28 ENCOUNTER — TELEPHONE (OUTPATIENT)
Dept: GYNECOLOGIC ONCOLOGY | Facility: CLINIC | Age: 23
End: 2023-07-28
Payer: COMMERCIAL

## 2023-07-28 NOTE — TELEPHONE ENCOUNTER
Patient called after consult with Provider and informed no, she may not lift weights until her recovery period is complete.  Patient continues with when may she begin lifting weights and this nurse informed that her Provider would make that determination at her next Dr visit.  Patient continues with may she go swimming which Provider does approve at this time.

## 2023-07-28 NOTE — TELEPHONE ENCOUNTER
Caller: Nataliia Lr    Relationship: Self    Best call back number: [phone]     Who are you requesting to speak with (clinical staff, provider,  specific staff member): CLINICAL      What was the call regarding: PATIENT CALLING TO VERIFY IF IT IS OK TO BEGIN LIFTING WEIGHTS

## 2023-07-31 ENCOUNTER — CONSULT (OUTPATIENT)
Dept: ONCOLOGY | Facility: CLINIC | Age: 23
End: 2023-07-31
Payer: COMMERCIAL

## 2023-07-31 ENCOUNTER — LAB (OUTPATIENT)
Dept: LAB | Facility: HOSPITAL | Age: 23
End: 2023-07-31
Payer: COMMERCIAL

## 2023-07-31 VITALS
HEIGHT: 64 IN | TEMPERATURE: 97.7 F | DIASTOLIC BLOOD PRESSURE: 59 MMHG | WEIGHT: 157 LBS | HEART RATE: 92 BPM | BODY MASS INDEX: 26.8 KG/M2 | RESPIRATION RATE: 16 BRPM | OXYGEN SATURATION: 100 % | SYSTOLIC BLOOD PRESSURE: 107 MMHG

## 2023-07-31 DIAGNOSIS — I82.462 ACUTE DEEP VEIN THROMBOSIS (DVT) OF CALF MUSCLE VEIN OF LEFT LOWER EXTREMITY: Primary | ICD-10-CM

## 2023-07-31 DIAGNOSIS — I82.462 ACUTE DEEP VEIN THROMBOSIS (DVT) OF CALF MUSCLE VEIN OF LEFT LOWER EXTREMITY: ICD-10-CM

## 2023-07-31 LAB
BASOPHILS # BLD AUTO: 0.04 10*3/MM3 (ref 0–0.2)
BASOPHILS NFR BLD AUTO: 0.7 % (ref 0–1.5)
DEPRECATED RDW RBC AUTO: 41.7 FL (ref 37–54)
EOSINOPHIL # BLD AUTO: 0.28 10*3/MM3 (ref 0–0.4)
EOSINOPHIL NFR BLD AUTO: 4.8 % (ref 0.3–6.2)
ERYTHROCYTE [DISTWIDTH] IN BLOOD BY AUTOMATED COUNT: 12.8 % (ref 12.3–15.4)
FERRITIN SERPL-MCNC: 21.19 NG/ML (ref 13–150)
FOLATE SERPL-MCNC: 15.6 NG/ML (ref 4.78–24.2)
HCT VFR BLD AUTO: 42.3 % (ref 34–46.6)
HGB BLD-MCNC: 13.6 G/DL (ref 12–15.9)
IMM GRANULOCYTES # BLD AUTO: 0.01 10*3/MM3 (ref 0–0.05)
IMM GRANULOCYTES NFR BLD AUTO: 0.2 % (ref 0–0.5)
IRON 24H UR-MRATE: 49 MCG/DL (ref 37–145)
IRON SATN MFR SERPL: 12 % (ref 20–50)
LYMPHOCYTES # BLD AUTO: 1.68 10*3/MM3 (ref 0.7–3.1)
LYMPHOCYTES NFR BLD AUTO: 28.5 % (ref 19.6–45.3)
MCH RBC QN AUTO: 29.2 PG (ref 26.6–33)
MCHC RBC AUTO-ENTMCNC: 32.2 G/DL (ref 31.5–35.7)
MCV RBC AUTO: 91 FL (ref 79–97)
MONOCYTES # BLD AUTO: 0.49 10*3/MM3 (ref 0.1–0.9)
MONOCYTES NFR BLD AUTO: 8.3 % (ref 5–12)
NEUTROPHILS NFR BLD AUTO: 3.39 10*3/MM3 (ref 1.7–7)
NEUTROPHILS NFR BLD AUTO: 57.5 % (ref 42.7–76)
NRBC BLD AUTO-RTO: 0 /100 WBC (ref 0–0.2)
PLATELET # BLD AUTO: 172 10*3/MM3 (ref 140–450)
PMV BLD AUTO: 12.2 FL (ref 6–12)
RBC # BLD AUTO: 4.65 10*6/MM3 (ref 3.77–5.28)
TIBC SERPL-MCNC: 410 MCG/DL (ref 298–536)
TRANSFERRIN SERPL-MCNC: 275 MG/DL (ref 200–360)
VIT B12 BLD-MCNC: 427 PG/ML (ref 211–946)
WBC NRBC COR # BLD: 5.89 10*3/MM3 (ref 3.4–10.8)

## 2023-07-31 PROCEDURE — 82746 ASSAY OF FOLIC ACID SERUM: CPT

## 2023-07-31 PROCEDURE — 86146 BETA-2 GLYCOPROTEIN ANTIBODY: CPT

## 2023-07-31 PROCEDURE — 85306 CLOT INHIBIT PROT S FREE: CPT

## 2023-07-31 PROCEDURE — 36415 COLL VENOUS BLD VENIPUNCTURE: CPT

## 2023-07-31 PROCEDURE — 85303 CLOT INHIBIT PROT C ACTIVITY: CPT

## 2023-07-31 PROCEDURE — 83540 ASSAY OF IRON: CPT

## 2023-07-31 PROCEDURE — 85300 ANTITHROMBIN III ACTIVITY: CPT

## 2023-07-31 PROCEDURE — 85025 COMPLETE CBC W/AUTO DIFF WBC: CPT

## 2023-07-31 PROCEDURE — 84466 ASSAY OF TRANSFERRIN: CPT

## 2023-07-31 PROCEDURE — 99214 OFFICE O/P EST MOD 30 MIN: CPT | Performed by: INTERNAL MEDICINE

## 2023-07-31 PROCEDURE — 1126F AMNT PAIN NOTED NONE PRSNT: CPT | Performed by: INTERNAL MEDICINE

## 2023-07-31 PROCEDURE — 85305 CLOT INHIBIT PROT S TOTAL: CPT

## 2023-07-31 PROCEDURE — 82607 VITAMIN B-12: CPT

## 2023-07-31 PROCEDURE — 85302 CLOT INHIBIT PROT C ANTIGEN: CPT

## 2023-07-31 PROCEDURE — 82728 ASSAY OF FERRITIN: CPT

## 2023-07-31 PROCEDURE — 81241 F5 GENE: CPT

## 2023-07-31 PROCEDURE — 81240 F2 GENE: CPT

## 2023-07-31 PROCEDURE — 86147 CARDIOLIPIN ANTIBODY EA IG: CPT

## 2023-08-01 LAB
CARDIOLIPIN IGG SER IA-ACNC: <9 GPL U/ML (ref 0–14)
CARDIOLIPIN IGM SER IA-ACNC: 20 MPL U/ML (ref 0–12)

## 2023-08-02 LAB
AT III ACT/NOR PPP CHRO: 130 % (ref 75–135)
B2 GLYCOPROT1 IGA SER-ACNC: <9 GPI IGA UNITS (ref 0–25)
B2 GLYCOPROT1 IGG SER-ACNC: <9 GPI IGG UNITS (ref 0–20)
B2 GLYCOPROT1 IGM SER-ACNC: <9 GPI IGM UNITS (ref 0–32)
F5 GENE MUT ANL BLD/T: NORMAL
FACTOR II, DNA ANALYSIS: NORMAL
PROT C ACT/NOR PPP: 93 % (ref 73–180)
PROT S ACT/NOR PPP: 97 % (ref 63–140)

## 2023-08-09 LAB
PROT C AG ACT/NOR PPP IA: 87 % (ref 60–150)
PROT S AG ACT/NOR PPP IA: 112 % (ref 60–150)
PROT S FREE AG ACT/NOR PPP IA: 99 % (ref 61–136)

## 2023-08-14 ENCOUNTER — OFFICE VISIT (OUTPATIENT)
Dept: ONCOLOGY | Facility: CLINIC | Age: 23
End: 2023-08-14
Payer: COMMERCIAL

## 2023-08-14 VITALS
HEIGHT: 64 IN | OXYGEN SATURATION: 98 % | RESPIRATION RATE: 16 BRPM | HEART RATE: 86 BPM | SYSTOLIC BLOOD PRESSURE: 107 MMHG | DIASTOLIC BLOOD PRESSURE: 55 MMHG | BODY MASS INDEX: 26.98 KG/M2 | WEIGHT: 158 LBS | TEMPERATURE: 97.3 F

## 2023-08-14 DIAGNOSIS — I82.462 ACUTE DEEP VEIN THROMBOSIS (DVT) OF CALF MUSCLE VEIN OF LEFT LOWER EXTREMITY: Primary | ICD-10-CM

## 2023-08-14 PROCEDURE — 99214 OFFICE O/P EST MOD 30 MIN: CPT | Performed by: INTERNAL MEDICINE

## 2023-08-14 PROCEDURE — 1126F AMNT PAIN NOTED NONE PRSNT: CPT | Performed by: INTERNAL MEDICINE

## 2023-08-14 NOTE — PROGRESS NOTES
"DATE OF VISIT: 8/14/2023    REASON FOR VISIT: Followup for left lower extremity DVT     PROBLEM LIST:  1.  Left below-knee DVT:  A.  Presented with pain and edema left calf area  B.  Diagnosed after venous Doppler done July 3, 2023 confirming DVT in the posterior tibial vein  C.  On Eliquis 5 mg twice daily since July 5, 2023  2.  Positive anticardiolipin antibody:  A.  Antiphospholipid antibody panel revealed intermediate anticardiolipin antibody level of 20.    HISTORY OF PRESENT ILLNESS: The patient is a very pleasant 23 y.o. female  with past medical history significant for left lower extremity DVT diagnosed by 2023. The  patient is here today for scheduled follow-up visit.    SUBJECTIVE: Nataliia is here today with her mother-in-law.  She is complaining of pain in her left lower extremity.  She has been compliant with her Eliquis.    Past History:  Medical History: has a past medical history of Abnormal Pap smear of cervix, COVID-19, and Cyst, ovarian.   Surgical History: has a past surgical history that includes [place] tooth extraction and Ovarian cyst surgery (Right, 6/29/2023).   Family History: family history includes Breast cancer (age of onset: 47) in her mother.   Social History: reports that she has never smoked. She has never used smokeless tobacco. She reports that she does not drink alcohol and does not use drugs.    (Not in a hospital admission)     Allergies: Patient has no known allergies.     Review of Systems   Constitutional:  Positive for fatigue.   Musculoskeletal:  Positive for myalgias.     PHYSICAL EXAMINATION:   /55   Pulse 86   Temp 97.3 øF (36.3 øC) (Infrared)   Resp 16   Ht 162.2 cm (63.86\")   Wt 71.7 kg (158 lb)   SpO2 98%   BMI 27.24 kg/mý    Pain Score    08/14/23 1254   PainSc: 0-No pain        ECOG score: 0            ECOG Performance Status: 1 - Symptomatic but completely ambulatory      General Appearance:      alert, cooperative, no apparent distress and appears " stated age   Lungs:   Clear to auscultation bilaterally; respirations regular, even, and unlabored bilaterally   Heart:  Regular rate and rhythm, no murmurs appreciated   Abdomen:   Soft, non-tender, non-distended, and no organomegaly                 No visits with results within 2 Week(s) from this visit.   Latest known visit with results is:   Lab on 07/31/2023   Component Date Value Ref Range Status    Beta-2 Glyco 1 IgG 07/31/2023 <9  0 - 20 GPI IgG units Final    The reference interval reflects a 3SD or 99th percentile interval,  which is thought to represent a potentially clinically significant  result in accordance with the International Consensus Statement on  the classification criteria for definitive antiphospholipid syndrome  (APS). J Thromb Haem 2006;4:295-306.    Beta-2 Glyco 1 IgA 07/31/2023 <9  0 - 25 GPI IgA units Final    The reference interval reflects a 3SD or 99th percentile interval,  which is thought to represent a potentially clinically significant  result in accordance with the International Consensus Statement on  the classification criteria for definitive antiphospholipid syndrome  (APS). J Thromb Haem 2006;4:295-306.    Beta-2 Glyco 1 IgM 07/31/2023 <9  0 - 32 GPI IgM units Final    The reference interval reflects a 3SD or 99th percentile interval,  which is thought to represent a potentially clinically significant  result in accordance with the International Consensus Statement on  the classification criteria for definitive antiphospholipid syndrome  (APS). J Thromb Haem 2006;4:295-306.    Anticardiolipin IgG 07/31/2023 <9  0 - 14 GPL U/mL Final                              Negative:              <15                            Indeterminate:     15 - 20                            Low-Med Positive: >20 - 80                            High Positive:         >80    Anticardiolipin IgM 07/31/2023 20 (H)  0 - 12 MPL U/mL Final                              Negative:              <13                             Indeterminate:     13 - 20                            Low-Med Positive: >20 - 80                            High Positive:         >80    AntiThromb III Func 07/31/2023 130  75 - 135 % Final    Direct Xa inhibitor anticoagulants such as rivaroxaban, apixaban and  edoxaban will lead to spuriously elevated antithrombin activity  levels possibly masking a deficiency.    Factor V Leiden 07/31/2023 Normal  Normal Final    Protein S-Functional 07/31/2023 97  63 - 140 % Final    Protein S activity may be falsely increased (masking an abnormal, low  result) in patients receiving direct Xa inhibitor (e.g., rivaroxaban,  apixaban, edoxaban) or a direct thrombin inhibitor (e.g., dabigatran)  anticoagulant treatment due to assay interference by these drugs.    Protein C Antigen 07/31/2023 87  60 - 150 % Final    Protein S Ag, Total 07/31/2023 112  60 - 150 % Final    This test was developed and its performance characteristics  determined by [employer]. It has not been cleared or approved  by the Food and Drug Administration.    Protein S Ag, Free 07/31/2023 99  61 - 136 % Final    Protein C-Functional 07/31/2023 93  73 - 180 % Final    Factor II, DNA Analysis 07/31/2023 Normal  Normal Final    Iron 07/31/2023 49  37 - 145 mcg/dL Final    Iron Saturation (TSAT) 07/31/2023 12 (L)  20 - 50 % Final    Transferrin 07/31/2023 275  200 - 360 mg/dL Final    TIBC 07/31/2023 410  298 - 536 mcg/dL Final    Ferritin 07/31/2023 21.19  13.00 - 150.00 ng/mL Final    Folate 07/31/2023 15.60  4.78 - 24.20 ng/mL Final    Vitamin B-12 07/31/2023 427  211 - 946 pg/mL Final    WBC 07/31/2023 5.89  3.40 - 10.80 10*3/mm3 Final    RBC 07/31/2023 4.65  3.77 - 5.28 10*6/mm3 Final    Hemoglobin 07/31/2023 13.6  12.0 - 15.9 g/dL Final    Hematocrit 07/31/2023 42.3  34.0 - 46.6 % Final    MCV 07/31/2023 91.0  79.0 - 97.0 fL Final    MCH 07/31/2023 29.2  26.6 - 33.0 pg Final    MCHC 07/31/2023 32.2  31.5 - 35.7 g/dL Final    RDW 07/31/2023 12.8   12.3 - 15.4 % Final    RDW-SD 07/31/2023 41.7  37.0 - 54.0 fl Final    MPV 07/31/2023 12.2 (H)  6.0 - 12.0 fL Final    Platelets 07/31/2023 172  140 - 450 10*3/mm3 Final    Neutrophil % 07/31/2023 57.5  42.7 - 76.0 % Final    Lymphocyte % 07/31/2023 28.5  19.6 - 45.3 % Final    Monocyte % 07/31/2023 8.3  5.0 - 12.0 % Final    Eosinophil % 07/31/2023 4.8  0.3 - 6.2 % Final    Basophil % 07/31/2023 0.7  0.0 - 1.5 % Final    Immature Grans % 07/31/2023 0.2  0.0 - 0.5 % Final    Neutrophils, Absolute 07/31/2023 3.39  1.70 - 7.00 10*3/mm3 Final    Lymphocytes, Absolute 07/31/2023 1.68  0.70 - 3.10 10*3/mm3 Final    Monocytes, Absolute 07/31/2023 0.49  0.10 - 0.90 10*3/mm3 Final    Eosinophils, Absolute 07/31/2023 0.28  0.00 - 0.40 10*3/mm3 Final    Basophils, Absolute 07/31/2023 0.04  0.00 - 0.20 10*3/mm3 Final    Immature Grans, Absolute 07/31/2023 0.01  0.00 - 0.05 10*3/mm3 Final    nRBC 07/31/2023 0.0  0.0 - 0.2 /100 WBC Final        No results found.    ASSESSMENT: The patient is a very pleasant 23 y.o. female  with left lower extremity DVT      PLAN:    1.  Left lower extremity below-knee DVT:  A.  We will continue Eliquis 5 mg twice daily.  B.  The patient will complete 3 months of treatment for 6 October 2023.  C.  I did go over the thrombophilia work-up results with the patient.  The only abnormality was intermediate anticardiolipin antibody.  Rest of her antiphospholipid antibodies came back negative.  Protein C protein S both level and function came back normal.  No prothrombin gene mutation and normal factor V.  Normal Antithrombin III activity.  D.  I do think patient will require lifelong anticoagulation at this point unless she has a recurrent episode.    2.  Positive anticardiolipin antibody:  A.  I will check her levels 4 weeks after completion of anticoagulation.  B.  I will do full antifactor antibodies panel on return.    FOLLOW UP: 3 months with labs prior.    Deepa Merrill MD  8/14/2023

## 2023-08-21 DIAGNOSIS — I82.462 ACUTE DEEP VEIN THROMBOSIS (DVT) OF CALF MUSCLE VEIN OF LEFT LOWER EXTREMITY: ICD-10-CM

## 2023-08-21 RX ORDER — APIXABAN 5 MG/1
TABLET, FILM COATED ORAL
Qty: 60 TABLET | Refills: 0 | Status: SHIPPED | OUTPATIENT
Start: 2023-08-21

## 2023-08-22 RX ORDER — APIXABAN 5 MG/1
TABLET, FILM COATED ORAL
Qty: 60 TABLET | Refills: 0 | OUTPATIENT
Start: 2023-08-22

## 2023-08-22 NOTE — TELEPHONE ENCOUNTER
Rx Refill Note  Requested Prescriptions     Refused Prescriptions Disp Refills    Eliquis 5 MG tablet tablet [Pharmacy Med Name: ELIQUIS 5MG TABLETS] 60 tablet 0     Sig: TAKE 1 TABLET BY MOUTH TWICE DAILY     Refused By: AMY YUSUF     Reason for Refusal: Duplicate renewal request      Last office visit with prescribing clinician: 7/5/23    Next office visit with prescribing clinician: 1/9/24  Amy Yusuf MA  08/22/23, 08:12 EDT

## 2023-09-29 ENCOUNTER — TELEPHONE (OUTPATIENT)
Dept: ONCOLOGY | Facility: CLINIC | Age: 23
End: 2023-09-29
Payer: COMMERCIAL

## 2023-09-29 ENCOUNTER — HOSPITAL ENCOUNTER (OUTPATIENT)
Dept: CARDIOLOGY | Facility: HOSPITAL | Age: 23
Discharge: HOME | End: 2023-09-29
Payer: COMMERCIAL

## 2023-09-29 ENCOUNTER — TELEPHONE (OUTPATIENT)
Dept: GYNECOLOGIC ONCOLOGY | Facility: CLINIC | Age: 23
End: 2023-09-29
Payer: COMMERCIAL

## 2023-09-29 VITALS — BODY MASS INDEX: 28 KG/M2 | HEIGHT: 63 IN | WEIGHT: 158 LBS

## 2023-09-29 DIAGNOSIS — I82.462 ACUTE DEEP VEIN THROMBOSIS (DVT) OF CALF MUSCLE VEIN OF LEFT LOWER EXTREMITY: Primary | ICD-10-CM

## 2023-09-29 DIAGNOSIS — I82.462 ACUTE DEEP VEIN THROMBOSIS (DVT) OF CALF MUSCLE VEIN OF LEFT LOWER EXTREMITY: ICD-10-CM

## 2023-09-29 LAB
BH CV LOWER VASCULAR LEFT COMMON FEMORAL AUGMENT: NORMAL
BH CV LOWER VASCULAR LEFT COMMON FEMORAL COMPRESS: NORMAL
BH CV LOWER VASCULAR LEFT COMMON FEMORAL PHASIC: NORMAL
BH CV LOWER VASCULAR LEFT COMMON FEMORAL SPONT: NORMAL
BH CV LOWER VASCULAR LEFT DISTAL FEMORAL AUGMENT: NORMAL
BH CV LOWER VASCULAR LEFT DISTAL FEMORAL COMPRESS: NORMAL
BH CV LOWER VASCULAR LEFT DISTAL FEMORAL PHASIC: NORMAL
BH CV LOWER VASCULAR LEFT DISTAL FEMORAL SPONT: NORMAL
BH CV LOWER VASCULAR LEFT GASTRONEMIUS COMPRESS: NORMAL
BH CV LOWER VASCULAR LEFT GREATER SAPH AK COMPRESS: NORMAL
BH CV LOWER VASCULAR LEFT GREATER SAPH BK COMPRESS: NORMAL
BH CV LOWER VASCULAR LEFT LESSER SAPH COMPRESS: NORMAL
BH CV LOWER VASCULAR LEFT MID FEMORAL AUGMENT: NORMAL
BH CV LOWER VASCULAR LEFT MID FEMORAL COMPRESS: NORMAL
BH CV LOWER VASCULAR LEFT MID FEMORAL PHASIC: NORMAL
BH CV LOWER VASCULAR LEFT MID FEMORAL SPONT: NORMAL
BH CV LOWER VASCULAR LEFT PERONEAL COMPRESS: NORMAL
BH CV LOWER VASCULAR LEFT POPLITEAL AUGMENT: NORMAL
BH CV LOWER VASCULAR LEFT POPLITEAL COMPRESS: NORMAL
BH CV LOWER VASCULAR LEFT POPLITEAL PHASIC: NORMAL
BH CV LOWER VASCULAR LEFT POPLITEAL SPONT: NORMAL
BH CV LOWER VASCULAR LEFT POSTERIOR TIBIAL COMPRESS: NORMAL
BH CV LOWER VASCULAR LEFT PROFUNDA FEMORAL AUGMENT: NORMAL
BH CV LOWER VASCULAR LEFT PROFUNDA FEMORAL PHASIC: NORMAL
BH CV LOWER VASCULAR LEFT PROFUNDA FEMORAL SPONT: NORMAL
BH CV LOWER VASCULAR LEFT PROXIMAL FEMORAL AUGMENT: NORMAL
BH CV LOWER VASCULAR LEFT PROXIMAL FEMORAL COMPRESS: NORMAL
BH CV LOWER VASCULAR LEFT PROXIMAL FEMORAL PHASIC: NORMAL
BH CV LOWER VASCULAR LEFT PROXIMAL FEMORAL SPONT: NORMAL
BH CV LOWER VASCULAR LEFT SAPHENOFEMORAL JUNCTION AUGMENT: NORMAL
BH CV LOWER VASCULAR LEFT SAPHENOFEMORAL JUNCTION COMPRESS: NORMAL
BH CV LOWER VASCULAR LEFT SAPHENOFEMORAL JUNCTION PHASIC: NORMAL
BH CV LOWER VASCULAR LEFT SAPHENOFEMORAL JUNCTION SPONT: NORMAL
BH CV LOWER VASCULAR RIGHT COMMON FEMORAL AUGMENT: NORMAL
BH CV LOWER VASCULAR RIGHT COMMON FEMORAL COMPRESS: NORMAL
BH CV LOWER VASCULAR RIGHT COMMON FEMORAL PHASIC: NORMAL
BH CV LOWER VASCULAR RIGHT COMMON FEMORAL SPONT: NORMAL
BH CV LOWER VASCULAR RIGHT DISTAL FEMORAL AUGMENT: NORMAL
BH CV LOWER VASCULAR RIGHT DISTAL FEMORAL COMPRESS: NORMAL
BH CV LOWER VASCULAR RIGHT DISTAL FEMORAL SPONT: NORMAL
BH CV LOWER VASCULAR RIGHT GASTRONEMIUS COMPRESS: NORMAL
BH CV LOWER VASCULAR RIGHT GREATER SAPH AK COMPRESS: NORMAL
BH CV LOWER VASCULAR RIGHT GREATER SAPH BK COMPRESS: NORMAL
BH CV LOWER VASCULAR RIGHT LESSER SAPH COMPRESS: NORMAL
BH CV LOWER VASCULAR RIGHT MID FEMORAL AUGMENT: NORMAL
BH CV LOWER VASCULAR RIGHT MID FEMORAL COMPRESS: NORMAL
BH CV LOWER VASCULAR RIGHT MID FEMORAL PHASIC: NORMAL
BH CV LOWER VASCULAR RIGHT MID FEMORAL SPONT: NORMAL
BH CV LOWER VASCULAR RIGHT PERONEAL COMPRESS: NORMAL
BH CV LOWER VASCULAR RIGHT POPLITEAL AUGMENT: NORMAL
BH CV LOWER VASCULAR RIGHT POPLITEAL COMPRESS: NORMAL
BH CV LOWER VASCULAR RIGHT POPLITEAL PHASIC: NORMAL
BH CV LOWER VASCULAR RIGHT POPLITEAL SPONT: NORMAL
BH CV LOWER VASCULAR RIGHT POSTERIOR TIBIAL COMPRESS: NORMAL
BH CV LOWER VASCULAR RIGHT PROFUNDA FEMORAL AUGMENT: NORMAL
BH CV LOWER VASCULAR RIGHT PROFUNDA FEMORAL PHASIC: NORMAL
BH CV LOWER VASCULAR RIGHT PROFUNDA FEMORAL SPONT: NORMAL
BH CV LOWER VASCULAR RIGHT PROXIMAL FEMORAL AUGMENT: NORMAL
BH CV LOWER VASCULAR RIGHT PROXIMAL FEMORAL COMPRESS: NORMAL
BH CV LOWER VASCULAR RIGHT PROXIMAL FEMORAL PHASIC: NORMAL
BH CV LOWER VASCULAR RIGHT PROXIMAL FEMORAL SPONT: NORMAL
BH CV LOWER VASCULAR RIGHT SAPHENOFEMORAL JUNCTION AUGMENT: NORMAL
BH CV LOWER VASCULAR RIGHT SAPHENOFEMORAL JUNCTION COMPRESS: NORMAL
BH CV LOWER VASCULAR RIGHT SAPHENOFEMORAL JUNCTION PHASIC: NORMAL
BH CV LOWER VASCULAR RIGHT SAPHENOFEMORAL JUNCTION SPONT: NORMAL

## 2023-09-29 PROCEDURE — 93970 EXTREMITY STUDY: CPT

## 2023-09-29 NOTE — TELEPHONE ENCOUNTER
Caller: Nataliia Lr    Relationship: Self    Best call back number: [phone]    What is the best time to reach you: ASAP    Who are you requesting to speak with (clinical staff, provider,  specific staff member): CLINICAL    What was the call regarding: PT IS CALLING TO SEE IF DR COOK CAN PUT A ORDER FOR A ULTRASOUND IN, SO THEY CAN SEE IF SHE STILL HAS BLOOD CLOTS, SHE STATES SHE WILL BE COMING OFF HER BLOOD THINNERS THIS WEEKEND  PLEASE CALL TO ADVISE    Is it okay if the provider responds through MyChart: YES

## 2023-09-29 NOTE — TELEPHONE ENCOUNTER
Return call to Nataliia.  Nataliia wanting an ultrasound of her left leg that had a known blood clot in July.  Is finishing up her blood thinners (okayed by provider) this weekend.  Denies any pain in the left leg at this time but reports new red area and pain in right leg.  Spoke with NEDA Cardoza and will do a duplex on right lower extremity.  Nataliia states understood

## 2023-09-29 NOTE — TELEPHONE ENCOUNTER
Patient phoned. She had surgery in June and ended up with a blood clot. She has been on blood thinners and is supposed to go off of them this weekend. She is very concerned. She saw Dr Merrill and he told her is was OK to stop but she spoke with a friend and that friend told her she should have routine ultrasounds and would need to be on blood thinners for the rest of her life. Please advise.

## 2023-09-29 NOTE — TELEPHONE ENCOUNTER
Return call to Nataliia.  Nataliia wanting signs and symptoms of a blood clot.  Provided education of symptoms to include redness, warmth, tenderness.  Advised to go to ER if having shortness of breath.  Nataliia has appointment today for right lower extremity duplex for pain.  Nataliia states understood

## 2023-09-29 NOTE — TELEPHONE ENCOUNTER
RN called the patient and discussed her question about blood clots with her. It became clear through the conversation that the patient needed to redirect her questions to Dr. Merrill's office as the clarification is between information that the patient states Dr. Merrill told her and the information that Desirae's  nurse told her. RN confirmed that the patient had the phone number to call Dr. Merrill's office and recommended that the patient call that office for clarification. Patient verbalized understanding.

## 2023-10-02 ENCOUNTER — TELEPHONE (OUTPATIENT)
Dept: ONCOLOGY | Facility: CLINIC | Age: 23
End: 2023-10-02
Payer: COMMERCIAL

## 2023-10-02 NOTE — TELEPHONE ENCOUNTER
----- Message from NEDA Goins sent at 10/2/2023  7:49 AM EDT -----  Regarding: Results  Can you let her know results are normal.  She may have already seen results on MyChart     ----- Message -----  From: Ammon Jones MD  Sent: 9/29/2023   4:06 PM EDT  To: NEDA Goins

## 2023-10-13 ENCOUNTER — TELEPHONE (OUTPATIENT)
Dept: ONCOLOGY | Facility: CLINIC | Age: 23
End: 2023-10-13
Payer: COMMERCIAL

## 2023-10-13 ENCOUNTER — TELEPHONE (OUTPATIENT)
Dept: ONCOLOGY | Facility: CLINIC | Age: 23
End: 2023-10-13

## 2023-10-13 NOTE — TELEPHONE ENCOUNTER
Caller: Nataliia Lr    Relationship: Self    Best call back number: [phone]    What is the best time to reach you: ASAP    Who are you requesting to speak with (clinical staff, provider,  specific staff member): CLINICAL    What was the call regarding: PT REQUESTING A CALL BACK SHE HAS BEEN OFF ELIQUIS FOR ABOUT 2 WEEKS AND IS BRUISING VERY EASY ON HER LEGS, REQUESTING A CALL FROM CLINICAL    Is it okay if the provider responds through MyChart: N/A

## 2023-10-13 NOTE — TELEPHONE ENCOUNTER
I returned patient call. She is still concerned about the bruising on her legs.  I advised I just asked Dr. Merrill again today after talking to him earlier and showed him her pictures.  I advised she should not be bruising from being off of blood thinners.  I advised he wants her to watch it.  She seems anxious about it and I advised if concerned to go to [facility] this weekend or go see PCP prior to her f/u on the 30th if she has concerns and to have labs checked.  She verbalized understanding.

## 2023-10-13 NOTE — TELEPHONE ENCOUNTER
Caller: Nataliia Lr    Relationship to patient: Self    Best call back number: [phone]    Chief complaint: R/S    Type of visit: FOLLOW UP    Requested date: ANYTIME WEEK THROUGH 10-23    If rescheduling, when is the original appointment: 10-30    Additional notes: PT ALSO REQUESTING TO SWITCH TO A FEMALE PROVIDER

## 2023-10-17 ENCOUNTER — TELEPHONE (OUTPATIENT)
Dept: ONCOLOGY | Facility: CLINIC | Age: 23
End: 2023-10-17
Payer: COMMERCIAL

## 2023-10-17 NOTE — TELEPHONE ENCOUNTER
Caller: Nataliia Lr    Relationship to patient: Self    Best call back number: [phone]    Type of visit: F/U APPT    Requested date: ASAP    If rescheduling, when is the original appointment: 10/30/23    Additional notes: PT WOULD LIKE TO SEE A FEMALE PROVIDER IF POSSIBLE.

## 2023-10-18 NOTE — TELEPHONE ENCOUNTER
Attempted to make contact with patient by calling number in chart and was unsuccessful. Letting patient know that office does not switch providers per policy. Patient will need to stay with current provider or transfer care.

## 2023-10-19 ENCOUNTER — TELEPHONE (OUTPATIENT)
Dept: ONCOLOGY | Facility: CLINIC | Age: 23
End: 2023-10-19

## 2023-10-19 ENCOUNTER — LAB (OUTPATIENT)
Dept: LAB | Facility: HOSPITAL | Age: 23
End: 2023-10-19
Payer: COMMERCIAL

## 2023-10-19 DIAGNOSIS — I82.462 ACUTE DEEP VEIN THROMBOSIS (DVT) OF CALF MUSCLE VEIN OF LEFT LOWER EXTREMITY: ICD-10-CM

## 2023-10-19 LAB
BASOPHILS # BLD AUTO: 0.04 10*3/MM3 (ref 0–0.2)
BASOPHILS NFR BLD AUTO: 0.6 % (ref 0–1.5)
D DIMER PPP FEU-MCNC: 1.06 MCGFEU/ML (ref 0–0.5)
DEPRECATED RDW RBC AUTO: 43.1 FL (ref 37–54)
EOSINOPHIL # BLD AUTO: 0.23 10*3/MM3 (ref 0–0.4)
EOSINOPHIL NFR BLD AUTO: 3.3 % (ref 0.3–6.2)
ERYTHROCYTE [DISTWIDTH] IN BLOOD BY AUTOMATED COUNT: 13.8 % (ref 12.3–15.4)
HCT VFR BLD AUTO: 38.5 % (ref 34–46.6)
HGB BLD-MCNC: 12.5 G/DL (ref 12–15.9)
IMM GRANULOCYTES # BLD AUTO: 0.04 10*3/MM3 (ref 0–0.05)
IMM GRANULOCYTES NFR BLD AUTO: 0.6 % (ref 0–0.5)
LYMPHOCYTES # BLD AUTO: 1.9 10*3/MM3 (ref 0.7–3.1)
LYMPHOCYTES NFR BLD AUTO: 27.5 % (ref 19.6–45.3)
MCH RBC QN AUTO: 28.1 PG (ref 26.6–33)
MCHC RBC AUTO-ENTMCNC: 32.5 G/DL (ref 31.5–35.7)
MCV RBC AUTO: 86.5 FL (ref 79–97)
MONOCYTES # BLD AUTO: 0.56 10*3/MM3 (ref 0.1–0.9)
MONOCYTES NFR BLD AUTO: 8.1 % (ref 5–12)
NEUTROPHILS NFR BLD AUTO: 4.15 10*3/MM3 (ref 1.7–7)
NEUTROPHILS NFR BLD AUTO: 59.9 % (ref 42.7–76)
NRBC BLD AUTO-RTO: 0 /100 WBC (ref 0–0.2)
PLATELET # BLD AUTO: 188 10*3/MM3 (ref 140–450)
PMV BLD AUTO: 13 FL (ref 6–12)
RBC # BLD AUTO: 4.45 10*6/MM3 (ref 3.77–5.28)
WBC NRBC COR # BLD: 6.92 10*3/MM3 (ref 3.4–10.8)

## 2023-10-19 PROCEDURE — 85025 COMPLETE CBC W/AUTO DIFF WBC: CPT

## 2023-10-19 PROCEDURE — 85670 THROMBIN TIME PLASMA: CPT

## 2023-10-19 PROCEDURE — 36415 COLL VENOUS BLD VENIPUNCTURE: CPT

## 2023-10-19 PROCEDURE — 85613 RUSSELL VIPER VENOM DILUTED: CPT

## 2023-10-19 PROCEDURE — 85732 THROMBOPLASTIN TIME PARTIAL: CPT

## 2023-10-19 PROCEDURE — 85379 FIBRIN DEGRADATION QUANT: CPT

## 2023-10-19 PROCEDURE — 85705 THROMBOPLASTIN INHIBITION: CPT

## 2023-10-19 PROCEDURE — 86147 CARDIOLIPIN ANTIBODY EA IG: CPT

## 2023-10-19 NOTE — TELEPHONE ENCOUNTER
Caller: Nataliia Lr    Relationship to patient: Self    Best call back number: [phone]    Patient is needing: TO RETURN CALL FROM TODAY. HUB WAS UNSUCCESSFUL AT [facility].

## 2023-10-19 NOTE — TELEPHONE ENCOUNTER
Caller: Nataliia Lr    Relationship: Self    Best call back number: [phone]    What is the best time to reach you: ASAP    Who are you requesting to speak with (clinical staff, provider,  specific staff member): SCHEDULING    What was the call regarding: REQUESTING A CALL BACK FROM [facility] TO [facility]

## 2023-10-21 LAB
CARDIOLIPIN IGG SER IA-ACNC: <9 GPL U/ML (ref 0–14)
CARDIOLIPIN IGM SER IA-ACNC: 17 MPL U/ML (ref 0–12)

## 2023-10-23 LAB
APTT SCREEN TO CONFIRM RATIO: 1.14 RATIO (ref 0–1.34)
CONFIRM APTT/NORMAL: 38.6 SEC (ref 0–47.6)
LA 2 SCREEN W REFLEX-IMP: NORMAL
SCREEN APTT: 34.2 SEC (ref 0–43.5)
SCREEN DRVVT: 38 SEC (ref 0–47)
THROMBIN TIME: 19 SEC (ref 0–23)

## 2023-10-23 NOTE — TELEPHONE ENCOUNTER
Called patient and left a voicemail stating we unable to change providers within the office. She is rescheduled for 11/3 at 1:30pm.

## 2023-10-23 NOTE — TELEPHONE ENCOUNTER
Caller: Nataliia Lr    Relationship: Self    Best call back number: [phone]    What is the best time to reach you: ANYTIME    Who are you requesting to speak with (clinical staff, provider,  specific staff member):     Do you know the name of the person who called:     What was the call regarding: PLEASE CALL PATIENT IF SHE CAN NOT SEE ANOTHER PROVIDER, THEN SHE WILL NEED TO R/S APPT FROM 10/30/2023.    TRIED TO W/T NO ANSWER     Is it okay if the provider responds through [employer]: YES

## 2023-10-26 NOTE — PROGRESS NOTES
"DATE OF VISIT: 10/27/2023    REASON FOR VISIT: Followup for left lower extremity DVT     PROBLEM LIST:  1.  Left below-knee DVT:  A.  Presented with pain and edema left calf area  B.  Diagnosed after venous Doppler done July 3, 2023 confirming DVT in the posterior tibial vein  C.  On Eliquis 5 mg twice daily since July 5, 2023  2.  Positive anticardiolipin antibody:  A.  Antiphospholipid antibody panel revealed intermediate anticardiolipin antibody level of 20.    HISTORY OF PRESENT ILLNESS: The patient is a very pleasant 23 y.o. female  with past medical history significant for left lower extremity DVT diagnosed by 2023. The  patient is here today for scheduled follow-up visit.    SUBJECTIVE: Nataliia is here today with her mother-in-law.  She has been having some on and off pain in her legs and arms.  She also notes some spontaneous bruises that are gradually getting better.    Past History:  Medical History: has a past medical history of Abnormal Pap smear of cervix, COVID-19, and Cyst, ovarian.   Surgical History: has a past surgical history that includes Beason tooth extraction and Ovarian cyst surgery (Right, 6/29/2023).   Family History: family history includes Breast cancer (age of onset: 47) in her mother.   Social History: reports that she has never smoked. She has never used smokeless tobacco. She reports that she does not drink alcohol and does not use drugs.    (Not in a hospital admission)     Allergies: Patient has no known allergies.     Review of Systems   Constitutional:  Positive for fatigue.   Musculoskeletal:  Positive for myalgias.       PHYSICAL EXAMINATION:   /64   Pulse 73   Temp 97.3 °F (36.3 °C)   Resp 18   Ht 160 cm (63\")   Wt 75.8 kg (167 lb)   BMI 29.58 kg/m²    Pain Score    10/27/23 1514   PainSc: 0-No pain          ECOG score: 0            ECOG Performance Status: 1 - Symptomatic but completely ambulatory      General Appearance:      alert, cooperative, no apparent " distress and appears stated age   Lungs:   Clear to auscultation bilaterally; respirations regular, even, and unlabored bilaterally   Heart:  Regular rate and rhythm, no murmurs appreciated   Abdomen:   Soft, non-tender, non-distended, and no organomegaly                 Lab on 10/19/2023   Component Date Value Ref Range Status    D-Dimer, Quantitative 10/19/2023 1.06 (H)  0.00 - 0.50 MCGFEU/mL Final    Anticardiolipin IgG 10/19/2023 <9  0 - 14 GPL U/mL Final                              Negative:              <15                            Indeterminate:     15 - 20                            Low-Med Positive: >20 - 80                            High Positive:         >80    Anticardiolipin IgM 10/19/2023 17 (H)  0 - 12 MPL U/mL Final                              Negative:              <13                            Indeterminate:     13 - 20                            Low-Med Positive: >20 - 80                            High Positive:         >80    Dilute Prothrombin Time(dPT) 10/19/2023 38.6  0.0 - 47.6 sec Final    dPT Confirm Ratio 10/19/2023 1.14  0.00 - 1.34 Ratio Final    Thrombin Time 10/19/2023 19.0  0.0 - 23.0 sec Final    PTT Lupus Anticoagulant 10/19/2023 34.2  0.0 - 43.5 sec Final    Dilute Viper Venom Time 10/19/2023 38.0  0.0 - 47.0 sec Final    Lupus Anticoagulant Reflex 10/19/2023 Comment:   Final    No lupus anticoagulant was detected.    WBC 10/19/2023 6.92  3.40 - 10.80 10*3/mm3 Final    RBC 10/19/2023 4.45  3.77 - 5.28 10*6/mm3 Final    Hemoglobin 10/19/2023 12.5  12.0 - 15.9 g/dL Final    Hematocrit 10/19/2023 38.5  34.0 - 46.6 % Final    MCV 10/19/2023 86.5  79.0 - 97.0 fL Final    MCH 10/19/2023 28.1  26.6 - 33.0 pg Final    MCHC 10/19/2023 32.5  31.5 - 35.7 g/dL Final    RDW 10/19/2023 13.8  12.3 - 15.4 % Final    RDW-SD 10/19/2023 43.1  37.0 - 54.0 fl Final    MPV 10/19/2023 13.0 (H)  6.0 - 12.0 fL Final    Platelets 10/19/2023 188  140 - 450 10*3/mm3 Final    Neutrophil % 10/19/2023  59.9  42.7 - 76.0 % Final    Lymphocyte % 10/19/2023 27.5  19.6 - 45.3 % Final    Monocyte % 10/19/2023 8.1  5.0 - 12.0 % Final    Eosinophil % 10/19/2023 3.3  0.3 - 6.2 % Final    Basophil % 10/19/2023 0.6  0.0 - 1.5 % Final    Immature Grans % 10/19/2023 0.6 (H)  0.0 - 0.5 % Final    Neutrophils, Absolute 10/19/2023 4.15  1.70 - 7.00 10*3/mm3 Final    Lymphocytes, Absolute 10/19/2023 1.90  0.70 - 3.10 10*3/mm3 Final    Monocytes, Absolute 10/19/2023 0.56  0.10 - 0.90 10*3/mm3 Final    Eosinophils, Absolute 10/19/2023 0.23  0.00 - 0.40 10*3/mm3 Final    Basophils, Absolute 10/19/2023 0.04  0.00 - 0.20 10*3/mm3 Final    Immature Grans, Absolute 10/19/2023 0.04  0.00 - 0.05 10*3/mm3 Final    nRBC 10/19/2023 0.0  0.0 - 0.2 /100 WBC Final        Duplex Venous Lower Extremity - Left CAR    Result Date: 10/16/2023  Narrative: CLINICAL INDICATION: Acute limb swelling. TECHNIQUE: Non-invasive, real time duplex exam of the lower extremity venous circulation with Doppler ultrasonic waveform and spectral analysis was performed. COMPARISON: None. FINDINGS: Right: Venous duplex demonstrates a compressible and augmentable common femoral vein, imaged for comparison purposes. Left: Venous duplex demonstrates compressible common femoral, femoral, popliteal, posterior tibial and peroneal veins.  The venous spectral analysis demonstrates a spontaneous, phasic, augmentable and nonpulsatile flow signal.    Impression: Left: Normal study; no evidence of acute DVT is identified.   COMMUNICATION: Per this written report. Preliminary report signed by CALOS BOJORQUEZ on 10/16/2023 8:21 AM By electronically signing this report, I, the attending physician, attest that I have personally reviewed the images/data for the above examination(s) and agree with the final edited report. Drafted by CALOS BOJORQUEZ on 10/16/2023 8:20 AM Final report signed by Gisella Alexander MD on 10/16/2023 9:32 AM    Duplex Venous Lower Extremity -  Bilateral CAR    Result Date: 9/29/2023  Narrative:   Normal bilateral lower extremity venous duplex scan.      ASSESSMENT: The patient is a very pleasant 23 y.o. female  with left lower extremity DVT      PLAN:    1.  Left lower extremity below-knee DVT:  A.  She completed 3 months of Eliquis October 2023.  B. Repeated venous U/S showed no evidence of residuary clot.     2.  Positive anticardiolipin antibody:  A.  Repeated testing showed borderline IgM level.  B. I will repeat testing in 6 months    FOLLOW UP: 6 months with labs prior.    Deepa Merrill MD  10/27/2023

## 2023-10-27 ENCOUNTER — OFFICE VISIT (OUTPATIENT)
Dept: ONCOLOGY | Facility: CLINIC | Age: 23
End: 2023-10-27
Payer: COMMERCIAL

## 2023-10-27 VITALS
TEMPERATURE: 97.3 F | BODY MASS INDEX: 29.59 KG/M2 | HEART RATE: 73 BPM | HEIGHT: 63 IN | OXYGEN SATURATION: 97 % | RESPIRATION RATE: 18 BRPM | DIASTOLIC BLOOD PRESSURE: 64 MMHG | WEIGHT: 167 LBS | SYSTOLIC BLOOD PRESSURE: 120 MMHG

## 2023-10-27 DIAGNOSIS — I82.462 ACUTE DEEP VEIN THROMBOSIS (DVT) OF CALF MUSCLE VEIN OF LEFT LOWER EXTREMITY: Primary | ICD-10-CM

## 2023-10-27 PROCEDURE — 1126F AMNT PAIN NOTED NONE PRSNT: CPT | Performed by: INTERNAL MEDICINE

## 2023-11-14 ENCOUNTER — TELEPHONE (OUTPATIENT)
Dept: ONCOLOGY | Facility: CLINIC | Age: 23
End: 2023-11-14
Payer: COMMERCIAL

## 2023-11-14 NOTE — TELEPHONE ENCOUNTER
Patient's father called patient is bruising on her legs, and this started after she stopped taking the blood thinner. Please call to discuss.

## 2023-12-19 ENCOUNTER — OFFICE VISIT (OUTPATIENT)
Dept: OBSTETRICS AND GYNECOLOGY | Facility: CLINIC | Age: 23
End: 2023-12-19
Payer: COMMERCIAL

## 2023-12-19 VITALS
HEIGHT: 63 IN | SYSTOLIC BLOOD PRESSURE: 104 MMHG | WEIGHT: 163.2 LBS | BODY MASS INDEX: 28.92 KG/M2 | DIASTOLIC BLOOD PRESSURE: 76 MMHG

## 2023-12-19 DIAGNOSIS — Z87.42 HISTORY OF ENDOMETRIOSIS: ICD-10-CM

## 2023-12-19 DIAGNOSIS — N92.0 MENORRHAGIA WITH REGULAR CYCLE: ICD-10-CM

## 2023-12-19 DIAGNOSIS — Z87.42 HISTORY OF ABNORMAL CERVICAL PAP SMEAR: Primary | ICD-10-CM

## 2023-12-19 DIAGNOSIS — N94.6 DYSMENORRHEA: ICD-10-CM

## 2023-12-19 DIAGNOSIS — Z86.718 HISTORY OF DVT (DEEP VEIN THROMBOSIS): ICD-10-CM

## 2023-12-19 NOTE — PROGRESS NOTES
Chief Complaint   Patient presents with    Gynecologic Exam       Subjective   HPI  Nataliia Lr is a 23 y.o. female,  Who presents to establish care and discuss endometriosis. Patient had laparoscopic Right ovarian cystectomy on 2023 and developed a postoperative DVT in the lower extremity.   Patient would also like to discuss fertility concerns. Her and her  have been trying to conceive for a month now with no success.     No contraception since  2yr ago.    Her last LMP was Patient's last menstrual period was 12/15/2023 (exact date).  Her periods occur every 25-35 days , lasting >7 days. The flow is heavy saturating a pad/tampon 4-5 times a day. This heavy bleeding lasts for 2-3 days of her period. She reports dysmenorrhea is severe, occurring first 1-2 days of flow. Patient reports problems with: none.  Partner Status: Marital Status: .  New Partners since last visit: no.  Desires STD Screening: no.    Additional OB/GYN History   Current contraception: contraceptive methods: None  Desires to: do not start contraception  Last Pap : 2023 ASCUS, HPV pool +  Last Completed Pap Smear       This patient has no relevant Health Maintenance data.          History of abnormal Pap smear: yes - 2023, Colpo Negative per patient  Last mammogram:   Last Completed Mammogram       This patient has no relevant Health Maintenance data.          Tobacco Usage?: No   OB History          0    Para   0    Term   0       0    AB   0    Living   0         SAB   0    IAB   0    Ectopic   0    Molar   0    Multiple   0    Live Births   0                Health Maintenance   Topic Date Due    Annual Gynecologic Pelvic and Breast Exam  Never done    COVID-19 Vaccine (1) Never done    HPV VACCINES (1 - 2-dose series) Never done    TDAP/TD VACCINES (1 - Tdap) Never done    HEPATITIS C SCREENING  Never done    PAP SMEAR  Never done    INFLUENZA VACCINE  Never done     "ANNUAL PHYSICAL  11/28/2023    BMI FOLLOWUP  11/28/2023    Pneumococcal Vaccine 0-64  Aged Out       The additional following portions of the patient's history were reviewed and updated as appropriate: allergies, current medications, past family history, past medical history, past social history, past surgical history, and problem list.    Review of Systems   All other systems reviewed and are negative.      I have reviewed and agree with the HPI, ROS, and historical information as entered above. Chrystal Carlos MD      Objective   /76   Ht 160 cm (62.99\")   Wt 74 kg (163 lb 3.2 oz)   LMP 12/15/2023 (Exact Date)   BMI 28.92 kg/m²     Physical Exam  Physical Exam:  General:  well developed; well nourished  no acute distress   Abdomen: soft, non-tender; no masses  no umbilical or inguinal hernias are present  no hepato-splenomegaly   Pelvis: Clinical staff was present for exam  External genitalia:  normal appearance of the external genitalia including Bartholin's and Ralston's glands.  :  urethral meatus normal;  Vaginal:  normal pink mucosa without prolapse or lesions.  Cervix:  normal appearance.  Uterus:  normal size, shape and consistency. tenderness to palpation is mild;  Adnexa:  normal bimanual exam of the adnexa. tenderness of the right adnexa to  deep palpation;        Assessment & Plan     Assessment     Problem List Items Addressed This Visit       Menorrhagia with regular cycle    History of endometriosis    Dysmenorrhea    History of abnormal cervical Pap smear - Primary    Relevant Orders    LIQUID-BASED PAP SMEAR WITH HPV GENOTYPING IF ASCUS ([facility])    History of DVT (deep vein thrombosis)       Plan        Rec bASA for intermediate Anticardiolipin.  Has f/u with Dr. Merrill for repeat labs    Rec semen analysis and if normal and not pregnant in 6mo, HSG.    Reviewed pap guidelines. Pap done today    Return in about 6 months (around 6/19/2024) for Annual physical.       Chrystal ZURITA" MD [place]  12/19/2023

## 2023-12-29 LAB — REF LAB TEST METHOD: NORMAL

## 2024-01-05 ENCOUNTER — TELEPHONE (OUTPATIENT)
Dept: OBSTETRICS AND GYNECOLOGY | Facility: CLINIC | Age: 24
End: 2024-01-05
Payer: COMMERCIAL

## 2024-01-18 ENCOUNTER — TELEPHONE (OUTPATIENT)
Dept: ONCOLOGY | Facility: CLINIC | Age: 24
End: 2024-01-18
Payer: COMMERCIAL

## 2024-01-18 ENCOUNTER — TELEPHONE (OUTPATIENT)
Dept: OBSTETRICS AND GYNECOLOGY | Facility: CLINIC | Age: 24
End: 2024-01-18
Payer: COMMERCIAL

## 2024-01-18 NOTE — TELEPHONE ENCOUNTER
Returned patient's call. She is asking if she can be seen sooner than scheduled.   LMP 12/15/23 = 4w 6d  6/29/23  Laparoscopic right ovarian cystectomy per Dr. Hector  7/3/23  LLE DVT  Saw Dr. Merrill, Hem/Onc, for management  7/31/23 Cardiolipin IgM = 20, intermediate range  9/2023  Stopped Eliquis  10/19/23 Cardiolipin IgM = 17, intermediate range  12/19/23 Had New GYN visit with Dr. Carlos; was trying to conceive; discussed bASA for Cardiolipin antibodies  2/14/24 NOB visit is scheduled with Dr. Nieves (patient confirmed her request to change providers; v/u that any/all of our providers may be involved in her care)     Discussed with Dr. Nieves. She has never seen patient. She recommends patient contact Dr. Merrill regarding anticoagulation during pregnancy since she is established with him. Keep appointment here as scheduled.   Informed patient. Explained that it is too early for ultrasound to be helpful. She v/u and will contact Dr. Merrill's office.

## 2024-01-18 NOTE — TELEPHONE ENCOUNTER
Patient called she just found out she is pregnant, and had a DVT in the past and took Eliquis for 3 months she wants to know if she needs to start a blood thinner back due to this and to prevent a blood clot? Please call.

## 2024-01-18 NOTE — TELEPHONE ENCOUNTER
NOB patient states she has an history of blood clots and would like to know if she can be seen sooner than what she is schedule for.

## 2024-01-19 NOTE — TELEPHONE ENCOUNTER
RN called patient back, let her know that Dr. Merrill would like to see her in 2 weeks to discuss going back on a blood thinner. Patient said her OB advised her to start baby aspirin, asked if that was okay. RN discussed with Dr. Merrill who said that was fine for her to start at this time and we will see her in 2 weeks. Patient v/u.

## 2024-01-24 ENCOUNTER — TELEPHONE (OUTPATIENT)
Dept: OBSTETRICS AND GYNECOLOGY | Facility: CLINIC | Age: 24
End: 2024-01-24
Payer: COMMERCIAL

## 2024-01-24 NOTE — TELEPHONE ENCOUNTER
Pt experiencing nausea with a couple episodes of vomiting over the last couple days. Advised pt to try unisom/B6. Advised pt to eat small frequent meals and sips of fluids as often as can tolerate. She has noticed a small amount of blood after dry heaving but it is not a large amount. She has been advised to take a daily baby ASA but was unsure how much to take. Reviewed dose, 81 mg daily. She VU,

## 2024-01-24 NOTE — TELEPHONE ENCOUNTER
Provider: DR HOPSON    Caller: ROMERO VINSON    Phone Number: [phone]    Reason for Call: PATIENT STATED THAT FOR THE LAST WEEK SHE HAS BEEN EXPERIENCING NAUSEA AND VOMITING AND IS HAVING TROUBLE KEEPING ANYTHING DOWN//PATIENT STATED THAT TODAY AND ONE OTHER TIME THIS WEEK SHE HAS HAD BLOOD COME UP FROM THE DRY HEAVING AND VOMITING//PATIENT IS JUST CONCERNED AND NOT SURE WHAT SHE NEEDS TO DO PLEASE FOLLOW UP

## 2024-02-02 ENCOUNTER — OFFICE VISIT (OUTPATIENT)
Dept: FAMILY MEDICINE CLINIC | Facility: CLINIC | Age: 24
End: 2024-02-02
Payer: MEDICAID

## 2024-02-02 ENCOUNTER — HOSPITAL ENCOUNTER (EMERGENCY)
Facility: HOSPITAL | Age: 24
Discharge: HOME | End: 2024-02-02
Attending: EMERGENCY MEDICINE
Payer: COMMERCIAL

## 2024-02-02 VITALS
DIASTOLIC BLOOD PRESSURE: 78 MMHG | TEMPERATURE: 98.6 F | SYSTOLIC BLOOD PRESSURE: 94 MMHG | BODY MASS INDEX: 28.6 KG/M2 | OXYGEN SATURATION: 98 % | WEIGHT: 161.4 LBS | HEART RATE: 89 BPM | HEIGHT: 63 IN

## 2024-02-02 VITALS
SYSTOLIC BLOOD PRESSURE: 107 MMHG | DIASTOLIC BLOOD PRESSURE: 56 MMHG | TEMPERATURE: 98.9 F | HEIGHT: 64 IN | WEIGHT: 161 LBS | OXYGEN SATURATION: 100 % | RESPIRATION RATE: 16 BRPM | BODY MASS INDEX: 27.49 KG/M2 | HEART RATE: 79 BPM

## 2024-02-02 DIAGNOSIS — M54.32 SCIATICA OF LEFT SIDE: ICD-10-CM

## 2024-02-02 DIAGNOSIS — E86.1 HYPOTENSION DUE TO HYPOVOLEMIA: Primary | ICD-10-CM

## 2024-02-02 DIAGNOSIS — I95.89 HYPOTENSION DUE TO HYPOVOLEMIA: Primary | ICD-10-CM

## 2024-02-02 DIAGNOSIS — O21.9 VOMITING DURING PREGNANCY: Primary | ICD-10-CM

## 2024-02-02 LAB
ALBUMIN SERPL-MCNC: 4.2 G/DL (ref 3.5–5.2)
ALBUMIN/GLOB SERPL: 1.3 G/DL
ALP SERPL-CCNC: 55 U/L (ref 39–117)
ALT SERPL W P-5'-P-CCNC: 14 U/L (ref 1–33)
ANION GAP SERPL CALCULATED.3IONS-SCNC: 13 MMOL/L (ref 5–15)
AST SERPL-CCNC: 18 U/L (ref 1–32)
BASOPHILS # BLD AUTO: 0.05 10*3/MM3 (ref 0–0.2)
BASOPHILS NFR BLD AUTO: 0.5 % (ref 0–1.5)
BILIRUB SERPL-MCNC: 1.6 MG/DL (ref 0–1.2)
BUN SERPL-MCNC: 9 MG/DL (ref 6–20)
BUN/CREAT SERPL: 12.3 (ref 7–25)
CALCIUM SPEC-SCNC: 8.8 MG/DL (ref 8.6–10.5)
CHLORIDE SERPL-SCNC: 102 MMOL/L (ref 98–107)
CO2 SERPL-SCNC: 22 MMOL/L (ref 22–29)
CREAT SERPL-MCNC: 0.73 MG/DL (ref 0.57–1)
DEPRECATED RDW RBC AUTO: 44.7 FL (ref 37–54)
EGFRCR SERPLBLD CKD-EPI 2021: 116.5 ML/MIN/1.73
EOSINOPHIL # BLD AUTO: 0.09 10*3/MM3 (ref 0–0.4)
EOSINOPHIL NFR BLD AUTO: 0.9 % (ref 0.3–6.2)
ERYTHROCYTE [DISTWIDTH] IN BLOOD BY AUTOMATED COUNT: 14.5 % (ref 12.3–15.4)
GLOBULIN UR ELPH-MCNC: 3.3 GM/DL
GLUCOSE SERPL-MCNC: 107 MG/DL (ref 65–99)
HCT VFR BLD AUTO: 41.8 % (ref 34–46.6)
HGB BLD-MCNC: 13.4 G/DL (ref 12–15.9)
IMM GRANULOCYTES # BLD AUTO: 0.04 10*3/MM3 (ref 0–0.05)
IMM GRANULOCYTES NFR BLD AUTO: 0.4 % (ref 0–0.5)
LYMPHOCYTES # BLD AUTO: 1.5 10*3/MM3 (ref 0.7–3.1)
LYMPHOCYTES NFR BLD AUTO: 15.2 % (ref 19.6–45.3)
MCH RBC QN AUTO: 27.2 PG (ref 26.6–33)
MCHC RBC AUTO-ENTMCNC: 32.1 G/DL (ref 31.5–35.7)
MCV RBC AUTO: 84.8 FL (ref 79–97)
MONOCYTES # BLD AUTO: 0.7 10*3/MM3 (ref 0.1–0.9)
MONOCYTES NFR BLD AUTO: 7.1 % (ref 5–12)
NEUTROPHILS NFR BLD AUTO: 7.5 10*3/MM3 (ref 1.7–7)
NEUTROPHILS NFR BLD AUTO: 75.9 % (ref 42.7–76)
NRBC BLD AUTO-RTO: 0 /100 WBC (ref 0–0.2)
PLATELET # BLD AUTO: 162 10*3/MM3 (ref 140–450)
PMV BLD AUTO: 13.4 FL (ref 6–12)
POTASSIUM SERPL-SCNC: 3.7 MMOL/L (ref 3.5–5.2)
PROT SERPL-MCNC: 7.5 G/DL (ref 6–8.5)
RBC # BLD AUTO: 4.93 10*6/MM3 (ref 3.77–5.28)
SODIUM SERPL-SCNC: 137 MMOL/L (ref 136–145)
WBC NRBC COR # BLD AUTO: 9.88 10*3/MM3 (ref 3.4–10.8)

## 2024-02-02 PROCEDURE — 25810000003 SODIUM CHLORIDE 0.9 % SOLUTION: Performed by: PHYSICIAN ASSISTANT

## 2024-02-02 PROCEDURE — 99283 EMERGENCY DEPT VISIT LOW MDM: CPT

## 2024-02-02 PROCEDURE — 85025 COMPLETE CBC W/AUTO DIFF WBC: CPT | Performed by: PHYSICIAN ASSISTANT

## 2024-02-02 PROCEDURE — 80053 COMPREHEN METABOLIC PANEL: CPT | Performed by: PHYSICIAN ASSISTANT

## 2024-02-02 RX ADMIN — SODIUM CHLORIDE 1000 ML: 9 INJECTION, SOLUTION INTRAVENOUS at 17:10

## 2024-02-02 NOTE — ED PROVIDER NOTES
Subjective   History of Present Illness  24-year-old female presents to the emergency department today with hypotension.  She was seen by her primary care doctor and sent her here because her pressure was 100 over 60s.  She is not lightheaded.  She has had some nausea and vomiting from her pregnancy.  She denies any abdominal pain or vaginal bleeding.  She reports she has no dysuria frequency urgency or hematuria.  No other complaints.    History provided by:  Patient and significant other   used: No    Pregnancy Problem  The current episode started more than 2 days ago. The problem has been unchanged. Episode frequency: 1-2 times a day. Episode is mild. Gestational age is approximately 4 weeks. Patient reports no abdominal pain, no contractions, no decreased fetal movement, no leaking fluid, no vaginal bleeding and no vaginal discharge. Prenatal care has been regular.   Associated symptoms include nausea.   Associated symptoms include no dysuria, no fever, no headaches, no light-headedness, no malaise, no shortness of breath, no syncope, no vomiting and no weakness. Prior pregnancy complications do not include history of gestational diabetes, miscarriage, history of placenta previa, history of placental abruption, history of pre-eclampsia, prior premature delivery, history of  labor or prior stillbirth.   Risk factors do not include advanced maternal age, fetal anomaly, gestational diabetes, hypertension, obesity, pre-eclampsia, pre-eclampsia in family or vaginal bleeding during pregnancy.        Review of Systems   Constitutional:  Negative for fever.   Respiratory:  Negative for shortness of breath.    Cardiovascular:  Negative for syncope.   Gastrointestinal:  Positive for nausea. Negative for abdominal pain and vomiting.   Genitourinary:  Negative for dysuria, vaginal bleeding and vaginal discharge.   Neurological:  Negative for weakness, light-headedness and headaches.       Past  Medical History:   Diagnosis Date    Abnormal Pap smear of cervix     COVID-19     Cyst, ovarian        No Known Allergies    Past Surgical History:   Procedure Laterality Date    OVARIAN CYST SURGERY Right 6/29/2023    Procedure: OVARIAN CYSTECTOMY LAPAROSCOPIC WITH DAVINCI ROBOT RIGHT, PLACEMENT OF ADHESION BARRIER;  Surgeon: Tejal Hector MD;  Location: [facility];  Service: Robotics - DaVinci;  Laterality: Right;    WISDOM TOOTH EXTRACTION         Family History   Problem Relation Age of Onset    Breast cancer Mother 47       Social History     Socioeconomic History    Marital status:    Tobacco Use    Smoking status: Never    Smokeless tobacco: Never   Vaping Use    Vaping Use: Never used   Substance and Sexual Activity    Alcohol use: Never    Drug use: Never    Sexual activity: Defer           Objective   Physical Exam  Vitals and nursing note reviewed.   Constitutional:       General: She is not in acute distress.     Appearance: She is well-developed. She is not diaphoretic.   HENT:      Head: Normocephalic and atraumatic.      Nose: Nose normal.      Mouth/Throat:      Mouth: Mucous membranes are dry.   Eyes:      General: No scleral icterus.     Conjunctiva/sclera: Conjunctivae normal.   Cardiovascular:      Rate and Rhythm: Normal rate and regular rhythm.      Heart sounds: Normal heart sounds. No murmur heard.  Pulmonary:      Effort: Pulmonary effort is normal. No respiratory distress.      Breath sounds: Normal breath sounds.   Abdominal:      General: Bowel sounds are normal.      Palpations: Abdomen is soft.      Tenderness: There is no abdominal tenderness.   Musculoskeletal:         General: Normal range of motion.      Cervical back: Normal range of motion and neck supple.   Skin:     General: Skin is warm and dry.   Neurological:      Mental Status: She is alert and oriented to person, place, and time.   Psychiatric:         Behavior: Behavior normal.         Procedures           ED  Course  ED Course as of 02/02/24 1931 Fri Feb 02, 2024 1837 She does not have any abdominal pain or vaginal bleeding.  She has not vomited since she has been here.  We discussed using vitamin B6 and Unisom over-the-counter.  She has an appointment with her OB/GYN on the 14th of this month.  Laboratory data white count of 9.8 with an H&H of 13 and 42 and a platelet count of 162.  CMP glucose was 106 with a BUN of 9 and a creatinine 1.73.  Sodium 137 and a potassium of 3.7 liver functions were unremarkable other than a slightly elevated bilirubin of 1.6.  ALT of 14 AST of 18 and alk phos 55. [FRANCES]      ED Course User Index  [FRANCES] Branden Lin PA                                 Recent Results (from the past 24 hour(s))   Comprehensive Metabolic Panel    Collection Time: 02/02/24  5:10 PM    Specimen: Blood   Result Value Ref Range    Glucose 107 (H) 65 - 99 mg/dL    BUN 9 6 - 20 mg/dL    Creatinine 0.73 0.57 - 1.00 mg/dL    Sodium 137 136 - 145 mmol/L    Potassium 3.7 3.5 - 5.2 mmol/L    Chloride 102 98 - 107 mmol/L    CO2 22.0 22.0 - 29.0 mmol/L    Calcium 8.8 8.6 - 10.5 mg/dL    Total Protein 7.5 6.0 - 8.5 g/dL    Albumin 4.2 3.5 - 5.2 g/dL    ALT (SGPT) 14 1 - 33 U/L    AST (SGOT) 18 1 - 32 U/L    Alkaline Phosphatase 55 39 - 117 U/L    Total Bilirubin 1.6 (H) 0.0 - 1.2 mg/dL    Globulin 3.3 gm/dL    A/G Ratio 1.3 g/dL    BUN/Creatinine Ratio 12.3 7.0 - 25.0    Anion Gap 13.0 5.0 - 15.0 mmol/L    eGFR 116.5 >60.0 mL/min/1.73   CBC Auto Differential    Collection Time: 02/02/24  5:10 PM    Specimen: Blood   Result Value Ref Range    WBC 9.88 3.40 - 10.80 10*3/mm3    RBC 4.93 3.77 - 5.28 10*6/mm3    Hemoglobin 13.4 12.0 - 15.9 g/dL    Hematocrit 41.8 34.0 - 46.6 %    MCV 84.8 79.0 - 97.0 fL    MCH 27.2 26.6 - 33.0 pg    MCHC 32.1 31.5 - 35.7 g/dL    RDW 14.5 12.3 - 15.4 %    RDW-SD 44.7 37.0 - 54.0 fl    MPV 13.4 (H) 6.0 - 12.0 fL    Platelets 162 140 - 450 10*3/mm3    Neutrophil % 75.9 42.7 - 76.0 %     Lymphocyte % 15.2 (L) 19.6 - 45.3 %    Monocyte % 7.1 5.0 - 12.0 %    Eosinophil % 0.9 0.3 - 6.2 %    Basophil % 0.5 0.0 - 1.5 %    Immature Grans % 0.4 0.0 - 0.5 %    Neutrophils, Absolute 7.50 (H) 1.70 - 7.00 10*3/mm3    Lymphocytes, Absolute 1.50 0.70 - 3.10 10*3/mm3    Monocytes, Absolute 0.70 0.10 - 0.90 10*3/mm3    Eosinophils, Absolute 0.09 0.00 - 0.40 10*3/mm3    Basophils, Absolute 0.05 0.00 - 0.20 10*3/mm3    Immature Grans, Absolute 0.04 0.00 - 0.05 10*3/mm3    nRBC 0.0 0.0 - 0.2 /100 WBC     Note: In addition to lab results from this visit, the labs listed above may include labs taken at another facility or during a different encounter within the last 24 hours. Please correlate lab times with ED admission and discharge times for further clarification of the services performed during this visit.    No orders to display     Vitals:    02/02/24 1743 02/02/24 1747 02/02/24 1802 02/02/24 1817   BP: 111/49 111/49 108/56 107/56   BP Location:       Patient Position:       Pulse: 82 83 83 79   Resp:       Temp:       TempSrc:       SpO2: 100% 100% 95% 100%   Weight:       Height:         Medications   sodium chloride 0.9 % bolus 1,000 mL (0 mL Intravenous Stopped 2/2/24 1757)     ECG/EMG Results (last 24 hours)       ** No results found for the last 24 hours. **          No orders to display                   Medical Decision Making  Problems Addressed:  Vomiting during pregnancy: complicated acute illness or injury    Amount and/or Complexity of Data Reviewed  Labs: ordered. Decision-making details documented in ED Course.        Final diagnoses:   Vomiting during pregnancy       ED Disposition  ED Disposition       ED Disposition   Discharge    Condition   Stable    Comment   --               Your OB/GYN as scheduled               Medication List      No changes were made to your prescriptions during this visit.            Branden Lin PA  02/02/24 1930

## 2024-02-02 NOTE — PROGRESS NOTES
Office Note     Name: Nataliia Lr    : 2000     MRN: [id number]     Chief Complaint  Morning Sickness (Recently pregnant as is having bad morning sickness. ) and leg numbness (Says her left leg goes numb and gets tingly. Started leg than a week ago. Not painful. )    Subjective     History of Present Illness:  Nataliia Lr is a 24 y.o. female who presents today for worsening abdominal pain and nausea.  She is accompanied by her significant other.  Patient states that she is currently having what she believes 7 weeks pregnant.  She states that she has had morning sickness since the beginning of her pregnancy.  This is her first and only pregnancy.  Patient states that over the past 2 days she has had worsening and more frequent vomiting and abdominal pain.  Patient states that in the past 2 days she has not been able to keep down any food or water.  She states every time she drinks water she throws it up.  Patient does not have any other known sick contacts right now.  Patient denies any vaginal bleeding.  She states she has lost 4 to 5 pounds in the past 2 days.  Patient's blood pressure was 94/78 when she first arrived.  Upon recheck it was 96/76.  She denies any dizziness, changes in her bowel or bladder habits, chest pain, shortness of breath.    Patient also states that since the beginning of her pregnancy she is also noticed some pain in her low back on the left side that radiates down the back of her left leg.  She states there is some associated tingling and numbness.  She states that it happens intermittently, but more commonly after she has been laying on her side in bed.  Denies any saddle anesthesias, loss of bowel or bladder control.  No known injuries.      Review of Systems:   Review of Systems   Constitutional:  Positive for appetite change. Negative for chills and fever.   HENT:  Negative for congestion and sore throat.    Eyes:  Negative for visual disturbance.   Respiratory:   "Negative for cough, chest tightness, shortness of breath and wheezing.    Cardiovascular:  Negative for chest pain.   Gastrointestinal:  Positive for abdominal distention, abdominal pain, nausea and vomiting. Negative for constipation.   Genitourinary:  Negative for vaginal bleeding, vaginal discharge and vaginal pain.   Musculoskeletal:  Positive for back pain.   Neurological:  Negative for dizziness and light-headedness.       Past Medical History:   Past Medical History:   Diagnosis Date    Abnormal Pap smear of cervix     COVID-19     Cyst, ovarian        Past Surgical History:   Past Surgical History:   Procedure Laterality Date    OVARIAN CYST SURGERY Right 6/29/2023    Procedure: OVARIAN CYSTECTOMY LAPAROSCOPIC WITH DAVINCI ROBOT RIGHT, PLACEMENT OF ADHESION BARRIER;  Surgeon: Tejal Hector MD;  Location[facility];  Service: Robotics - DaVinci;  Laterality: Right;    WISDOM TOOTH EXTRACTION         Family History:   Family History   Problem Relation Age of Onset    Breast cancer Mother 47       Social History:   Social History     Socioeconomic History    Marital status:    Tobacco Use    Smoking status: Never    Smokeless tobacco: Never   Vaping Use    Vaping Use: Never used   Substance and Sexual Activity    Alcohol use: Never    Drug use: Never    Sexual activity: Defer       Immunizations:   There is no immunization history on file for this patient.     Medications:   No current facility-administered medications for this visit.  No current outpatient medications on file.    Allergies:   No Known Allergies    Objective     Vital Signs  BP 94/78   Pulse 89   Temp 98.6 °F (37 °C) (Oral)   Ht 160 cm (62.99\")   Wt 73.2 kg (161 lb 6.4 oz)   SpO2 98%   BMI 28.60 kg/m²   Estimated body mass index is 28.6 kg/m² as calculated from the following:    Height as of this encounter: 160 cm (62.99\").    Weight as of this encounter: 73.2 kg (161 lb 6.4 oz).    BMI is >= 25 and <30. (Overweight) The " following options were offered after discussion;: none (medical contraindication)  Patient is pregnant.    Physical Exam  Vitals reviewed.   Constitutional:       Appearance: Normal appearance. She is ill-appearing.   HENT:      Head: Normocephalic and atraumatic.      Nose: Nose normal.      Mouth/Throat:      Mouth: Mucous membranes are moist.      Pharynx: No posterior oropharyngeal erythema.   Eyes:      Pupils: Pupils are equal, round, and reactive to light.   Cardiovascular:      Rate and Rhythm: Normal rate and regular rhythm.      Pulses: Normal pulses.      Heart sounds: Normal heart sounds.   Pulmonary:      Effort: Pulmonary effort is normal.      Breath sounds: Normal breath sounds.   Abdominal:      General: Abdomen is flat. Bowel sounds are normal.      Tenderness: There is generalized abdominal tenderness.   Skin:     General: Skin is warm.   Neurological:      General: No focal deficit present.      Mental Status: She is alert and oriented to person, place, and time.   Psychiatric:         Mood and Affect: Mood normal.            Assessment and Plan     Assessment/Plan:  Diagnoses and all orders for this visit:    1. Hypotension due to hypovolemia (Primary)  Assessment & Plan:  Discussed with the patient and her partner that I believe would be in her best interest to go to the emergency room for IV fluid replacement.  With the patient not being able to keep down any fluids or food I am worried about her blood pressure dropping anymore as she is pregnant.    I also discussed with the patient that I would like her to go to the ER as she is having abdominal pain early in pregnancy.  Where she has not had an ultrasound yet I cannot be confirmed whether pregnancy is within the uterus or not.  I explained that there is always a risk of ectopic pregnancy which can present with abdominal pain nausea and vomiting.  Patient and her partner verbalized understanding and agreed to go to the emergency room this  evening.      2. Sciatica of left side  Assessment & Plan:  Sciatica is extremely common in pregnancy.  If her symptoms become more persistent or do not go away, you develop increasing back pain, loss of bowel or bladder control or saddle anesthesias that would indicate need for emergent evaluation.  I believe that this is likely just compression of your sciatic nerve.  Would recommend Tylenol as needed and stretches that I have attached your AVS.          Follow Up  Return if symptoms worsen or fail to improve, for Annual.    Trisha Solomon PA-C   [facility]

## 2024-02-02 NOTE — ASSESSMENT & PLAN NOTE
Discussed with the patient and her partner that I believe would be in her best interest to go to the emergency room for IV fluid replacement.  With the patient not being able to keep down any fluids or food I am worried about her blood pressure dropping anymore as she is pregnant.    I also discussed with the patient that I would like her to go to the ER as she is having abdominal pain early in pregnancy.  Where she has not had an ultrasound yet I cannot be confirmed whether pregnancy is within the uterus or not.  I explained that there is always a risk of ectopic pregnancy which can present with abdominal pain nausea and vomiting.  Patient and her partner verbalized understanding and agreed to go to the emergency room this evening.

## 2024-02-02 NOTE — ASSESSMENT & PLAN NOTE
Sciatica is extremely common in pregnancy.  If her symptoms become more persistent or do not go away, you develop increasing back pain, loss of bowel or bladder control or saddle anesthesias that would indicate need for emergent evaluation.  I believe that this is likely just compression of your sciatic nerve.  Would recommend Tylenol as needed and stretches that I have attached your AVS.

## 2024-02-05 ENCOUNTER — TELEPHONE (OUTPATIENT)
Dept: ONCOLOGY | Facility: CLINIC | Age: 24
End: 2024-02-05

## 2024-02-05 ENCOUNTER — OFFICE VISIT (OUTPATIENT)
Dept: ONCOLOGY | Facility: CLINIC | Age: 24
End: 2024-02-05

## 2024-02-05 VITALS
DIASTOLIC BLOOD PRESSURE: 57 MMHG | RESPIRATION RATE: 18 BRPM | BODY MASS INDEX: 27.83 KG/M2 | HEIGHT: 64 IN | SYSTOLIC BLOOD PRESSURE: 109 MMHG | TEMPERATURE: 98.3 F | OXYGEN SATURATION: 100 % | HEART RATE: 89 BPM | WEIGHT: 163 LBS

## 2024-02-05 DIAGNOSIS — I82.462 ACUTE DEEP VEIN THROMBOSIS (DVT) OF CALF MUSCLE VEIN OF LEFT LOWER EXTREMITY: Primary | ICD-10-CM

## 2024-02-05 PROCEDURE — 99214 OFFICE O/P EST MOD 30 MIN: CPT | Performed by: INTERNAL MEDICINE

## 2024-02-05 NOTE — TELEPHONE ENCOUNTER
RN called patient, no answer. LVM to let her know that she needs to see Dr. Merrill today and not Erinn since she is newly pregnant and we were going to discuss possibly starting blood thinners. Left call back number for any questions.

## 2024-02-05 NOTE — PROGRESS NOTES
"DATE OF VISIT: 2/5/2024    REASON FOR VISIT: Followup for left lower extremity DVT     PROBLEM LIST:  1.  Left below-knee DVT:  A.  Presented with pain and edema left calf area  B.  Diagnosed after venous Doppler done July 3, 2023 confirming DVT in the posterior tibial vein  C.  On Eliquis 5 mg twice daily since July 5, 2023  2.  Positive anticardiolipin antibody:  A.  Antiphospholipid antibody panel revealed intermediate anticardiolipin antibody level of 20.  3.  First trimester pregnancy, 6 weeks    HISTORY OF PRESENT ILLNESS: The patient is a very pleasant 24 y.o. female  with past medical history significant for left lower extremity DVT diagnosed by 2023. The  patient is here today for scheduled follow-up visit.    SUBJECTIVE: Nataliia is here today with her  and mother-in-law.  She is 6 weeks pregnant.  Denies any bleedings.  She has not been able to tolerate aspirin.    Past History:  Medical History: has a past medical history of Abnormal Pap smear of cervix, COVID-19, and Cyst, ovarian.   Surgical History: has a past surgical history that includes [place] tooth extraction and Ovarian cyst surgery (Right, 6/29/2023).   Family History: family history includes Breast cancer (age of onset: 47) in her mother.   Social History: reports that she has never smoked. She has never used smokeless tobacco. She reports that she does not drink alcohol and does not use drugs.    (Not in a hospital admission)     Allergies: Patient has no known allergies.     Review of Systems   Constitutional:  Positive for fatigue.   Musculoskeletal:  Positive for myalgias.       PHYSICAL EXAMINATION:   /57   Pulse 89   Temp 98.3 °F (36.8 °C) (Temporal)   Resp 18   Ht 162.6 cm (64.02\")   Wt 73.9 kg (163 lb)   LMP 12/15/2023 (Exact Date)   SpO2 100%   BMI 27.96 kg/m²    Pain Score    02/05/24 1450   PainSc: 0-No pain          ECOG score: 0            ECOG Performance Status: 1 - Symptomatic but completely " ambulatory      General Appearance:      alert, cooperative, no apparent distress and appears stated age   Lungs:   Clear to auscultation bilaterally; respirations regular, even, and unlabored bilaterally   Heart:  Regular rate and rhythm, no murmurs appreciated   Abdomen:   Soft, non-tender, non-distended, and no organomegaly                 Admission on 02/02/2024, Discharged on 02/02/2024   Component Date Value Ref Range Status    Glucose 02/02/2024 107 (H)  65 - 99 mg/dL Final    BUN 02/02/2024 9  6 - 20 mg/dL Final    Creatinine 02/02/2024 0.73  0.57 - 1.00 mg/dL Final    Sodium 02/02/2024 137  136 - 145 mmol/L Final    Potassium 02/02/2024 3.7  3.5 - 5.2 mmol/L Final    Slight hemolysis detected by analyzer. Result may be falsely elevated.    Chloride 02/02/2024 102  98 - 107 mmol/L Final    CO2 02/02/2024 22.0  22.0 - 29.0 mmol/L Final    Calcium 02/02/2024 8.8  8.6 - 10.5 mg/dL Final    Total Protein 02/02/2024 7.5  6.0 - 8.5 g/dL Final    Albumin 02/02/2024 4.2  3.5 - 5.2 g/dL Final    ALT (SGPT) 02/02/2024 14  1 - 33 U/L Final    AST (SGOT) 02/02/2024 18  1 - 32 U/L Final    Alkaline Phosphatase 02/02/2024 55  39 - 117 U/L Final    Total Bilirubin 02/02/2024 1.6 (H)  0.0 - 1.2 mg/dL Final    Globulin 02/02/2024 3.3  gm/dL Final    Calculated Result    A/G Ratio 02/02/2024 1.3  g/dL Final    BUN/Creatinine Ratio 02/02/2024 12.3  7.0 - 25.0 Final    Anion Gap 02/02/2024 13.0  5.0 - 15.0 mmol/L Final    eGFR 02/02/2024 116.5  >60.0 mL/min/1.73 Final    WBC 02/02/2024 9.88  3.40 - 10.80 10*3/mm3 Final    RBC 02/02/2024 4.93  3.77 - 5.28 10*6/mm3 Final    Hemoglobin 02/02/2024 13.4  12.0 - 15.9 g/dL Final    Hematocrit 02/02/2024 41.8  34.0 - 46.6 % Final    MCV 02/02/2024 84.8  79.0 - 97.0 fL Final    MCH 02/02/2024 27.2  26.6 - 33.0 pg Final    MCHC 02/02/2024 32.1  31.5 - 35.7 g/dL Final    RDW 02/02/2024 14.5  12.3 - 15.4 % Final    RDW-SD 02/02/2024 44.7  37.0 - 54.0 fl Final    MPV 02/02/2024 13.4 (H)   6.0 - 12.0 fL Final    Platelets 02/02/2024 162  140 - 450 10*3/mm3 Final    Neutrophil % 02/02/2024 75.9  42.7 - 76.0 % Final    Lymphocyte % 02/02/2024 15.2 (L)  19.6 - 45.3 % Final    Monocyte % 02/02/2024 7.1  5.0 - 12.0 % Final    Eosinophil % 02/02/2024 0.9  0.3 - 6.2 % Final    Basophil % 02/02/2024 0.5  0.0 - 1.5 % Final    Immature Grans % 02/02/2024 0.4  0.0 - 0.5 % Final    Neutrophils, Absolute 02/02/2024 7.50 (H)  1.70 - 7.00 10*3/mm3 Final    Lymphocytes, Absolute 02/02/2024 1.50  0.70 - 3.10 10*3/mm3 Final    Monocytes, Absolute 02/02/2024 0.70  0.10 - 0.90 10*3/mm3 Final    Eosinophils, Absolute 02/02/2024 0.09  0.00 - 0.40 10*3/mm3 Final    Basophils, Absolute 02/02/2024 0.05  0.00 - 0.20 10*3/mm3 Final    Immature Grans, Absolute 02/02/2024 0.04  0.00 - 0.05 10*3/mm3 Final    nRBC 02/02/2024 0.0  0.0 - 0.2 /100 WBC Final        No results found.    ASSESSMENT: The patient is a very pleasant 24 y.o. female  with left lower extremity DVT      PLAN:    1.  Left lower extremity below-knee DVT:  A.  She completed 3 months of Eliquis October 2023.  B. Repeated venous U/S showed no evidence of residuary clot.     2.  Positive anticardiolipin antibody:  A.  Repeated testing showed borderline IgM level with negative IgA.    3.  First trimester pregnancy, 6 weeks:  A.  We had a long discussion about risk of renal thrombotic events.  The patient is at high risk given her personal history of DVT as well as borderline IgM anticardiolipin antibody.  B.  I do recommend a prophylactic baby aspirin 81 mg daily during pregnancy up to 6 weeks postpartum.  C.  If unfortunately patient has evidence of clot during pregnancy she will be treated with full anticoagulation.  D.  Another option is Lovenox 40 mg subcu daily if she is and able to tolerate baby aspirin daily.  I do think we have to switch her to Lovenox at this time if she is able to tolerate baby aspirin daily since her clot was single episode and below the  knee with negative thrombophilia workup except borderline IgM antibody.      FOLLOW UP: 3 months with labs.    Deepa Merrill MD  2/5/2024

## 2024-02-06 RX ORDER — ONDANSETRON 8 MG/1
8 TABLET, ORALLY DISINTEGRATING ORAL EVERY 8 HOURS PRN
Qty: 30 TABLET | Refills: 0 | Status: SHIPPED | OUTPATIENT
Start: 2024-02-06

## 2024-02-13 ENCOUNTER — TELEPHONE (OUTPATIENT)
Dept: OBSTETRICS AND GYNECOLOGY | Facility: CLINIC | Age: 24
End: 2024-02-13
Payer: COMMERCIAL

## 2024-02-14 ENCOUNTER — TELEPHONE (OUTPATIENT)
Dept: OBSTETRICS AND GYNECOLOGY | Facility: CLINIC | Age: 24
End: 2024-02-14

## 2024-02-14 ENCOUNTER — INITIAL PRENATAL (OUTPATIENT)
Dept: OBSTETRICS AND GYNECOLOGY | Facility: CLINIC | Age: 24
End: 2024-02-14
Payer: COMMERCIAL

## 2024-02-14 VITALS — SYSTOLIC BLOOD PRESSURE: 98 MMHG | BODY MASS INDEX: 28.31 KG/M2 | WEIGHT: 165 LBS | DIASTOLIC BLOOD PRESSURE: 60 MMHG

## 2024-02-14 DIAGNOSIS — Z87.42 HISTORY OF ABNORMAL CERVICAL PAP SMEAR: ICD-10-CM

## 2024-02-14 DIAGNOSIS — Z86.718 HISTORY OF DVT (DEEP VEIN THROMBOSIS): ICD-10-CM

## 2024-02-14 DIAGNOSIS — Q51.9 UTERINE ANOMALY: ICD-10-CM

## 2024-02-14 DIAGNOSIS — Z34.90 PRENATAL CARE, ANTEPARTUM: Primary | ICD-10-CM

## 2024-02-14 PROBLEM — O21.9 NAUSEA AND VOMITING IN PREGNANCY PRIOR TO 22 WEEKS GESTATION: Status: RESOLVED | Noted: 2024-02-02 | Resolved: 2024-02-14

## 2024-02-14 PROBLEM — I95.89 HYPOTENSION DUE TO HYPOVOLEMIA: Status: RESOLVED | Noted: 2024-02-02 | Resolved: 2024-02-14

## 2024-02-14 PROBLEM — R87.610 ASCUS WITH POSITIVE HIGH RISK HPV CERVICAL: Status: RESOLVED | Noted: 2023-06-11 | Resolved: 2024-02-14

## 2024-02-14 PROBLEM — N94.6 DYSMENORRHEA: Status: RESOLVED | Noted: 2023-12-19 | Resolved: 2024-02-14

## 2024-02-14 PROBLEM — R87.810 ASCUS WITH POSITIVE HIGH RISK HPV CERVICAL: Status: RESOLVED | Noted: 2023-06-11 | Resolved: 2024-02-14

## 2024-02-14 PROBLEM — N92.0 MENORRHAGIA WITH REGULAR CYCLE: Status: RESOLVED | Noted: 2023-12-19 | Resolved: 2024-02-14

## 2024-02-14 PROBLEM — E86.1 HYPOTENSION DUE TO HYPOVOLEMIA: Status: RESOLVED | Noted: 2024-02-02 | Resolved: 2024-02-14

## 2024-02-14 PROBLEM — Z98.890 POST-OPERATIVE STATE: Status: RESOLVED | Noted: 2023-07-21 | Resolved: 2024-02-14

## 2024-02-14 RX ORDER — PRENATAL VIT NO.126/IRON/FOLIC 28MG-0.8MG
TABLET ORAL DAILY
COMMUNITY

## 2024-02-14 RX ORDER — ENOXAPARIN SODIUM 100 MG/ML
40 INJECTION SUBCUTANEOUS EVERY 12 HOURS SCHEDULED
Qty: 12 ML | Refills: 5 | Status: SHIPPED | OUTPATIENT
Start: 2024-02-14

## 2024-02-15 ENCOUNTER — TELEPHONE (OUTPATIENT)
Dept: OBSTETRICS AND GYNECOLOGY | Facility: CLINIC | Age: 24
End: 2024-02-15
Payer: COMMERCIAL

## 2024-02-15 ENCOUNTER — TELEPHONE (OUTPATIENT)
Dept: ONCOLOGY | Facility: CLINIC | Age: 24
End: 2024-02-15
Payer: COMMERCIAL

## 2024-02-15 DIAGNOSIS — I82.462 ACUTE DEEP VEIN THROMBOSIS (DVT) OF CALF MUSCLE VEIN OF LEFT LOWER EXTREMITY: Primary | ICD-10-CM

## 2024-02-15 LAB
ABO GROUP BLD: NORMAL
APPEARANCE UR: CLEAR
BACTERIA #/AREA URNS HPF: ABNORMAL /HPF
BASOPHILS # BLD AUTO: 0 X10E3/UL (ref 0–0.2)
BASOPHILS NFR BLD AUTO: 1 %
BILIRUB UR QL STRIP: NEGATIVE
BLD GP AB SCN SERPL QL: NEGATIVE
CASTS URNS MICRO: ABNORMAL
COLOR UR: YELLOW
EOSINOPHIL # BLD AUTO: 0.1 X10E3/UL (ref 0–0.4)
EOSINOPHIL NFR BLD AUTO: 1 %
EPI CELLS #/AREA URNS HPF: ABNORMAL /HPF
ERYTHROCYTE [DISTWIDTH] IN BLOOD BY AUTOMATED COUNT: 15 % (ref 11.7–15.4)
GLUCOSE UR QL STRIP: NEGATIVE
HBV SURFACE AG SERPL QL IA: NEGATIVE
HCT VFR BLD AUTO: 38 % (ref 34–46.6)
HCV IGG SERPL QL IA: NON REACTIVE
HGB BLD-MCNC: 12.6 G/DL (ref 11.1–15.9)
HGB UR QL STRIP: NEGATIVE
HIV 1+2 AB+HIV1 P24 AG SERPL QL IA: NON REACTIVE
IMM GRANULOCYTES # BLD AUTO: 0 X10E3/UL (ref 0–0.1)
IMM GRANULOCYTES NFR BLD AUTO: 0 %
KETONES UR QL STRIP: NEGATIVE
LEUKOCYTE ESTERASE UR QL STRIP: NEGATIVE
LYMPHOCYTES # BLD AUTO: 1.6 X10E3/UL (ref 0.7–3.1)
LYMPHOCYTES NFR BLD AUTO: 19 %
MCH RBC QN AUTO: 26.8 PG (ref 26.6–33)
MCHC RBC AUTO-ENTMCNC: 33.2 G/DL (ref 31.5–35.7)
MCV RBC AUTO: 81 FL (ref 79–97)
MONOCYTES # BLD AUTO: 0.6 X10E3/UL (ref 0.1–0.9)
MONOCYTES NFR BLD AUTO: 8 %
NEUTROPHILS # BLD AUTO: 5.9 X10E3/UL (ref 1.4–7)
NEUTROPHILS NFR BLD AUTO: 71 %
NITRITE UR QL STRIP: NEGATIVE
PH UR STRIP: 6.5 [PH] (ref 5–8)
PLATELET # BLD AUTO: 166 X10E3/UL (ref 150–450)
PROT UR QL STRIP: NEGATIVE
RBC # BLD AUTO: 4.71 X10E6/UL (ref 3.77–5.28)
RBC #/AREA URNS HPF: ABNORMAL /HPF
RH BLD: POSITIVE
RPR SER QL: NON REACTIVE
RUBV IGG SERPL IA-ACNC: 1.09 INDEX
SP GR UR STRIP: 1.02 (ref 1–1.03)
UROBILINOGEN UR STRIP-MCNC: NORMAL MG/DL
WBC # BLD AUTO: 8.3 X10E3/UL (ref 3.4–10.8)
WBC #/AREA URNS HPF: ABNORMAL /HPF

## 2024-02-16 ENCOUNTER — TELEPHONE (OUTPATIENT)
Dept: OBSTETRICS AND GYNECOLOGY | Facility: CLINIC | Age: 24
End: 2024-02-16
Payer: COMMERCIAL

## 2024-02-16 LAB
BACTERIA UR CULT: NORMAL
BACTERIA UR CULT: NORMAL
C TRACH RRNA SPEC QL NAA+PROBE: NEGATIVE
N GONORRHOEA RRNA SPEC QL NAA+PROBE: NEGATIVE

## 2024-02-19 ENCOUNTER — TELEPHONE (OUTPATIENT)
Dept: OBSTETRICS AND GYNECOLOGY | Facility: CLINIC | Age: 24
End: 2024-02-19
Payer: COMMERCIAL

## 2024-02-20 ENCOUNTER — LAB (OUTPATIENT)
Dept: OBSTETRICS AND GYNECOLOGY | Facility: CLINIC | Age: 24
End: 2024-02-20
Payer: COMMERCIAL

## 2024-02-20 ENCOUNTER — TELEPHONE (OUTPATIENT)
Dept: OBSTETRICS AND GYNECOLOGY | Facility: CLINIC | Age: 24
End: 2024-02-20
Payer: COMMERCIAL

## 2024-02-20 DIAGNOSIS — Z34.90 PRENATAL CARE, ANTEPARTUM: Primary | ICD-10-CM

## 2024-02-20 NOTE — TELEPHONE ENCOUNTER
PATIENT IS CALLING BACK REGARDING THE DENTAL RELEASE FORM. IT HASN'T BEEN RECEIVED AS OF YET.  PATIENT IS AT THE DENTIST NOW

## 2024-02-20 NOTE — TELEPHONE ENCOUNTER
Caller: Nataliia Lr    Relationship: Self    Best call back number: [phone]    What form or medical record are you requesting: DENTAL CLEARANCE LETTER    How would you like to receive the form or medical records (pick-up, mail, fax): FAX  If fax, what is the fax number: [phone]     Timeframe paperwork needed: ASAP - PATIENT IS THERE NOW    Additional notes: PATIENT IS HAVING CLEANING AND X-RAYS

## 2024-02-22 ENCOUNTER — TELEPHONE (OUTPATIENT)
Dept: OBSTETRICS AND GYNECOLOGY | Facility: CLINIC | Age: 24
End: 2024-02-22
Payer: COMMERCIAL

## 2024-02-22 DIAGNOSIS — Z86.718 HISTORY OF DVT (DEEP VEIN THROMBOSIS): ICD-10-CM

## 2024-02-22 DIAGNOSIS — Z34.90 PRENATAL CARE, ANTEPARTUM: ICD-10-CM

## 2024-02-22 RX ORDER — ENOXAPARIN SODIUM 100 MG/ML
40 INJECTION SUBCUTANEOUS
Qty: 12 ML | Refills: 5 | Status: SHIPPED | OUTPATIENT
Start: 2024-02-22

## 2024-02-22 NOTE — TELEPHONE ENCOUNTER
Returned patient's call. She was given Rx for prophylactic Lovenox for hx of DVT and intermediate level of Cardiolipin IgM antibodies. States she spoke to her hematologist, Dr. Merrill a few days ago and he told her she only needs to take Lovenox 40 mg once daily but Rx from Dr. Nieves is for every 12 hours with amount for only 30 doses. Asking for clarification. Discussed with Dr. Nieves. Informed patient that she should only take the Lovenox once daily. She v/u and agreed. Prescription corrected to use once daily and notified Ximena at [facility] of the correction.

## 2024-02-22 NOTE — TELEPHONE ENCOUNTER
Patient states she is on lovenox instruction say to take every 12 hours, her dr said to take just once a day and wanted to discuss

## 2024-02-26 LAB
CFDNA.FET/CFDNA.TOTAL SFR FETUS: ABNORMAL %
CITATION REF LAB TEST: ABNORMAL
FET 13+18+21+X+Y ANEUP PLAS.CFDNA: ABNORMAL
GA EST FROM CONCEPTION DATE: ABNORMAL D
GESTATIONAL AGE > 9:: YES
LAB DIRECTOR NAME PROVIDER: ABNORMAL
LAB DIRECTOR NAME PROVIDER: ABNORMAL
LABORATORY COMMENT REPORT: ABNORMAL
LIMITATIONS OF THE TEST: ABNORMAL
NEGATIVE PREDICTIVE VALUE: ABNORMAL
NOTE: ABNORMAL
PERFORMANCE CHARACTERISTICS: ABNORMAL
REF LAB TEST METHOD: ABNORMAL
TEST PERFORMANCE INFO SPEC: ABNORMAL

## 2024-03-05 DIAGNOSIS — Z34.90 PRENATAL CARE, ANTEPARTUM: Primary | ICD-10-CM

## 2024-03-11 ENCOUNTER — TELEPHONE (OUTPATIENT)
Dept: ONCOLOGY | Facility: CLINIC | Age: 24
End: 2024-03-11
Payer: COMMERCIAL

## 2024-03-11 NOTE — TELEPHONE ENCOUNTER
Caller: Nataliia Lr    Relationship to patient: Self    Best call back number: [phone]    Chief complaint: PT CALLED TO SEE IF THERE WAS ANY OPENINGS THIS WEEK     Type of visit: FOLLOW UP    Requested date: ANYTHING THIS WEEK     If rescheduling, when is the original appointment: 3-18-24

## 2024-03-12 ENCOUNTER — OFFICE VISIT (OUTPATIENT)
Dept: ONCOLOGY | Facility: CLINIC | Age: 24
End: 2024-03-12
Payer: COMMERCIAL

## 2024-03-12 VITALS
BODY MASS INDEX: 26.98 KG/M2 | RESPIRATION RATE: 16 BRPM | DIASTOLIC BLOOD PRESSURE: 67 MMHG | HEIGHT: 64 IN | HEART RATE: 90 BPM | TEMPERATURE: 97.8 F | SYSTOLIC BLOOD PRESSURE: 100 MMHG | OXYGEN SATURATION: 98 % | WEIGHT: 158 LBS

## 2024-03-12 DIAGNOSIS — I82.462 ACUTE DEEP VEIN THROMBOSIS (DVT) OF CALF MUSCLE VEIN OF LEFT LOWER EXTREMITY: Primary | ICD-10-CM

## 2024-03-12 PROCEDURE — 99214 OFFICE O/P EST MOD 30 MIN: CPT | Performed by: INTERNAL MEDICINE

## 2024-03-12 PROCEDURE — 1126F AMNT PAIN NOTED NONE PRSNT: CPT | Performed by: INTERNAL MEDICINE

## 2024-03-12 RX ORDER — RIBOFLAVIN (VITAMIN B2) 100 MG
400 TABLET ORAL DAILY
COMMUNITY
Start: 2024-03-11 | End: 2024-09-07

## 2024-03-12 NOTE — PROGRESS NOTES
DATE OF VISIT: 3/12/2024    REASON FOR VISIT: Followup for left lower extremity DVT     PROBLEM LIST:  1.  Left below-knee DVT:  A.  Presented with pain and edema left calf area  B.  Diagnosed after venous Doppler done July 3, 2023 confirming DVT in the posterior tibial vein  C.  On Eliquis 5 mg twice daily since July 5, 2023  2.  Positive anticardiolipin antibody:  A.  Antiphospholipid antibody panel revealed intermediate anticardiolipin antibody level of 20.  3.  First trimester pregnancy, 15 weeks    HISTORY OF PRESENT ILLNESS: The patient is a very pleasant 24 y.o. female  with past medical history significant for left lower extremity DVT diagnosed by 2023. The  patient is here today for scheduled follow-up visit.    SUBJECTIVE: Nataliia is here today with her mother-in-law.  She has been taking Lovenox 40 mg subcu daily.    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   Surgical History: has a past surgical history that includes [place] tooth extraction and Ovarian cyst surgery (Right, 6/29/2023).   Family History: family history includes Breast cancer (age of onset: 47) in her mother.   Social History: reports that she has never smoked. She has never used smokeless tobacco. She reports that she does not drink alcohol and does not use drugs.    (Not in a hospital admission)     Allergies: Patient has no known allergies.     Review of Systems   Constitutional:  Positive for fatigue.   Hematological: Negative.    Psychiatric/Behavioral:  The patient is nervous/anxious.        PHYSICAL EXAMINATION:   LMP 12/15/2023 (Exact Date)    There were no vitals filed for this visit.                      ECOG Performance Status: 1 - Symptomatic but completely  ambulatory      General Appearance:      alert, cooperative, no apparent distress and appears stated age   Lungs:   Clear to auscultation bilaterally; respirations regular, even, and unlabored bilaterally   Heart:  Regular rate and rhythm, no murmurs appreciated   Abdomen:   Soft, non-tender, non-distended, and no organomegaly                 Orders Only on 03/05/2024   Component Date Value Ref Range Status    Gestation 03/05/2024 Stone   Final    Fetal Fraction 03/05/2024 4%   Final    Gestational Age >9: 03/05/2024 Yes   Final    Result 03/05/2024 Negative   Final     Comments 03/05/2024 Comment   Final    Comment: This specimen showed an expected representation of  chromosome 21, 18 and 13 material. Clinical correlation is  suggested.      Approved By 03/05/2024 Comment   Final    Fabricio Rapp MD, PhD, Director, [facility]    TRISOMY 21 (DOWN SYNDROME) 03/05/2024 Negative   Final    TRISOMY 18 (ANGEL SYNDROME) 03/05/2024 Negative   Final    TRISOMY 13 (PATAU SYNDROME) 03/05/2024 Negative   Final    FETAL SEX 03/05/2024 Comment   Final    Consistent with Male    MONOSOMY X (NELSON SYNDROME) 03/05/2024 Not Detected   Final    XYY (PARISI SYNDROME) 03/05/2024 Not Detected   Final    XXY (KLINEFELTER SYNDROME) 03/05/2024 Not Detected   Final    XXX (TRIPLE X SYNDROME) 03/05/2024 Not Detected   Final    22Q11 DELETION (DIGEORGE) 03/05/2024 Not Detected   Final    15Q11 DELETION (PW ANGELMAN) 03/05/2024 Not Detected   Final    11Q23 DELETION (LINDSAY) 03/05/2024 Not Detected   Final    8Q24 DELETION (MENA-GIEDION) 03/05/2024 Not Detected   Final    5P15 DELETION (Cri-du-chat) 03/05/2024 Not Detected   Final    4P16 DELETION (COOL-HIRSCHHORN) 03/05/2024 Not Detected   Final    1P36 DELETION SYNDROME 03/05/2024 Not Detected   Final    TRIOSOMY 16 03/05/2024 Not Detected   Final    TRISOMY 22 03/05/2024 Not Detected   Final    NEGATIVE PREDICTIVE VALUE 03/05/2024 Note   Final     Comment: The Negative Predictive Value (NPV) for trisomy 21, 18, and  13 is greater than 99%. The NPV for SCA and ESS cannot be  calculated as SCA and ESS are only reported when an  abnormality is detected.      POSITIVE PREDICTIVE VALUE 03/05/2024 N/A   Final    About The Test 03/05/2024 Comment   Final    Comment: The MaterniT(R) 21 PLUS laboratory-developed test (LDT)  analyzes circulating cell-free DNA from a maternal blood  sample. This test is used for screening purposes and not  diagnostic. Clinical correlation is recommended. Validation  data on twin pregnancies is limited and the ability of this  test to detect aneuploidy in higher multiple gestations has  not yet been validated.      Test Method 03/05/2024 Comment   Final    Comment: See Notes  Circulating cell-free DNA was purified from the plasma  component of maternal blood. The extracted DNA was then  converted into a genomic DNA library for aneuploidy  analysis of chromosomes 21, 18, and 13 via next generation  sequencing.[1] Optional findings based on the test order  include sex chromosome aneuploidy (SCA)[2], and enhanced  sequencing series (ESS)[3], which will only be reported on  as an additional finding when an abnormality is detected.  SCA testing includes information on X and Y representation,  while ESS testing includes deletions in selected regions  (22q, 15q, 11q, 8q, 5p, 4p, 1p) and trisomy of chromosomes  16 and 22.      Performance 03/05/2024 Comment   Final    Comment: The performance characteristics of the MaterniT(R) 21 PLUS  laboratory-developed test (LDT) have been determined in a  clinical validation study with pregnant women at increased  risk for fetal chromosomal aneuploidy.[1-4]      PERFORMANCE CHARACTERISTICS 03/05/2024 Note   Final    Comment: -----------------------------------------------------------  ! Fetal Sex                      ! Accuracy: 99.4%        !  !---------------------------------------------------------!  !  Region (associated syndrome)   ! Est. Sens# ! Est. Spec !  !---------------------------------------------------------!  ! Trisomy 21 (Down Syndrome)     ! 99.1%      ! 99.9%     !  !---------------------------------------------------------!  ! Trisomy 18 (Wilson Syndrome)  ! >99.9%     ! 99.6%     !  !---------------------------------------------------------!  ! Trisomy 13 (Patau Syndrome)    ! 91.7%      ! 99.7%     !  !---------------------------------------------------------!  ! Sex Chromosome Aneuploidies##  ! 96.2%      ! 99.7%     !  !---------------------------------------------------------!  * As reported in ISCA database nstd37  [https://www.ncbi.nlm.nih.gov/dbvar/studies/nstd37/ ]  # Estimated Sensitivity. Sensitivity estimated across the  observed size distribution of each syndrome [per ISCA                             database nstd37] and across the range of fetal fractions  observed in routine clinical NIPT. Actual sensitivity can  also be influenced by other factors such as the size of the  event, total sequence counts, amplification bias, or  sequence bias.  ## Stone gestation only.      Limitations of the Test 03/05/2024 Comment   Final    Comment: While the results of these tests are highly reliable,  discordant results, including inaccurate fetal sex  prediction, may occur due to placental, maternal, or fetal  mosaicism or neoplasm; vanishing twin; prior maternal organ  transplant; or other causes. These tests are screening  tests and not diagnostic; they do not replace the accuracy  and precision of prenatal diagnosis with CVS or  amniocentesis. A patient with a positive test result should  be referred for genetic counseling and offered invasive  prenatal diagnosis for confirmation of test results.[5] The  results of this testing, including the benefits and  limitations, should be discussed with a qualified  healthcare provider. Pregnancy management decisions,  including termination of the  pregnancy, should not be based  on the results of these tests alone. The healthcare  provider is responsible for the use of this information in  the management of their patient.  Sex chromosomal  aneuploidies are not reportable for known mul                           tiple  gestations. A negative result does not ensure an unaffected  pregnancy nor does it exclude the possibility of other  chromosomal abnormalities or birth defects which are not a  part of these tests. An uninformative result may be  reported, the causes of which may include, but are not  limited to, insufficient sequencing coverage, noise or  artifacts in the region, amplification or sequencing bias,  or insufficient fetal fraction. These tests are not  intended to identify pregnancies at risk for neural tube  defects or ventral wall defects. Testing for whole  chromosome abnormalities (including sex chromosomes) and  for subchromosomal abnormalities could lead to the  potential discovery of both fetal and maternal genomic  abnormalities that could have major, minor, or no, clinical  significance. Evaluating the significance of a positive or  a non-reportable result may involve both invasive testing  and additional studies on the mother. Such investigations  may lead to a diagnosis                            of maternal chromosomal or  subchromosomal abnormalities, which on occasion may be  associated with benign or malignant maternal neoplasms.  These tests may not accurately identify fetal triploidy,  balanced rearrangements, or the precise location of  subchromosomal duplications or deletions; these may be  detected by prenatal diagnosis with CVS or amniocentesis.  The ability to report results may be impacted by maternal  BMI, maternal weight, maternal systemic lupus erythematosus  (SLE) and/or by certain pharmaceutical agents such as low  molecular weight heparin (for example: Lovenox(R),  Xaparin(R), Clexane(R) and Fragmin(R)).      Note  03/05/2024 Comment   Final    Comment: See Notes  [facility] is a subsidiary of [employer], using the brand [facility]. This test was  developed and its performance characteristics determined by  [facility]. It has not been cleared or approved by the Food  and Drug Administration. This laboratory is certified under  the Clinical Laboratory Improvement Amendments (CLIA) as  qualified to perform high complexity clinical laboratory  testing and accredited by the College of American  Pathologists (CAP).      References 03/05/2024 Comment   Final    Comment: 1. Lary ZENDEJAS et al. Noemy Med. 2012;14(3):296-305.  2. Sonia SANCHEZ et al. Prenat Diag. 2013;33(6):591-597.  3. Layo C, et al. Clin Chem. 2015 Apr;61(4):608-616.  4. Lary ZENDEJAS et al. Noemy Med. 2011;13(11):913-920.  5. ACOG/SMFM Practice Bulletin No. 226, Oct 2020.          US Ob Transvaginal    Result Date: 2/14/2024  Narrative: PAT NAME: ROMERO VALDES [facility] REC#: [id number] BIRTH DA: 2000 PAT GEND: F ACCOUNT#: [id number] PAT TYPE: O EXAM CLEO: [id number] REF PHYS ROGELIO HOPSON ACCESSION [id number] Indication ======== Dating Comparison Studies ================= There are no relevant prior studies to which this study is being compared Method ======= [place] E6, Transvaginal ultrasound examination. View: Adequate view Pregnancy ========= Stone pregnancy. Number of fetuses: 1 Single intrauterine pregnancy present Dating ====== Method of dating: based on the LMP LMP on: 12/15/2023 GA by LMP 8 w + 5 d CRISTINE by LMP: 9/20/2024 Ultrasound examination on: 2/14/2024 GA by U/S based upon: CRL GA by U/S 9 w + 1 d CRISTINE by U/S: 9/17/2024 Assigned: based on the LMP, selected on 02/14/2024 Assigned GA 8 w + 5 d Assigned CRISTINE: 9/20/2024 Pregnancy length 280 d Fetal Biometry ============ Standard  bpm CRL 24.8 mm 9w 1d        99%        [place] General Evaluation ================ Cardiac activity present. Placenta anterior, Too early  to evaluate. Amniotic fluid normal. Some images suggestive of septate uterus with pregnancy in the left horn. Right horn contains fluid with echoes 29x24 mm . Cine for review. Maternal Structures ================ Uterus / Cervix Uterus: Visualized Cervix: Visualized Ovaries / Tubes / Adnexa Rt ovary: Visualized Rt ovary D1 29.7 mm Rt ovary D2 21.0 mm Rt ovary D3 14.6 mm Rt ovary Vol 4.8 cm³ Rt ovary other findings: heterogenous echo pattern cine for review Lt ovary: Visualized Lt ovary D1 29.0 mm Lt ovary D2 20.0 mm Impression ========== Single intrauterine pregnancy measuring 9 weeks 1 day today with fetal heart rate of 171. Uterine anomaly noted. Appears to have a septation in the uterus with the pregnancy in the left horn. Recommendation =============== Follow-up as clinically indicated. Sonographer: Cindy Baer RDMS Physician: Sierra Nieves MD, FACOG Electronically signed by: Sierra Nieves MD, FACOG at: 2024/02/14 09:59     ASSESSMENT: The patient is a very pleasant 24 y.o. female  with left lower extremity DVT      PLAN:    1.  Left lower extremity below-knee DVT:  A.  She completed 3 months of Eliquis October 2023.  B. Repeated venous U/S showed no evidence of residuary clot.     2.  Positive anticardiolipin antibody:  A.  Repeated testing showed borderline IgM level with negative IgG.    3.  First trimester pregnancy, 15 weeks:  A.  I explained to the patient that I am okay with prophylactic Lovenox.  I explained to her that given the fact that she had 1 episode of DVT and it was below knee and her thrombophilia workup was essentially negative except borderline but not positive anticardiolipin IgM level, no prophylaxis also would have been reasonable however her gynecologist felt safer with prophylaxis and I think that is reasonable as well.  B.  Continue prophylactic Lovenox 40 mg subcu daily.  I do think patient would need higher dose than this.  Recommend to switch her to heparin peripartum..  B.  The  patient is interested in second opinion.  I will refer her to see [facility].      FOLLOW UP: 6 weeks with labs.    Deepa Merrill MD  3/12/2024

## 2024-03-13 ENCOUNTER — HOSPITAL ENCOUNTER (OUTPATIENT)
Dept: WOMENS IMAGING | Facility: HOSPITAL | Age: 24
Discharge: HOME | End: 2024-03-13
Admitting: OBSTETRICS & GYNECOLOGY
Payer: COMMERCIAL

## 2024-03-13 ENCOUNTER — ROUTINE PRENATAL (OUTPATIENT)
Dept: OBSTETRICS AND GYNECOLOGY | Facility: CLINIC | Age: 24
End: 2024-03-13
Payer: COMMERCIAL

## 2024-03-13 ENCOUNTER — OFFICE VISIT (OUTPATIENT)
Dept: OBSTETRICS AND GYNECOLOGY | Facility: HOSPITAL | Age: 24
End: 2024-03-13
Payer: COMMERCIAL

## 2024-03-13 VITALS
SYSTOLIC BLOOD PRESSURE: 102 MMHG | HEIGHT: 65 IN | BODY MASS INDEX: 26.89 KG/M2 | DIASTOLIC BLOOD PRESSURE: 56 MMHG | WEIGHT: 161.4 LBS

## 2024-03-13 VITALS — BODY MASS INDEX: 26.63 KG/M2 | WEIGHT: 160 LBS | DIASTOLIC BLOOD PRESSURE: 60 MMHG | SYSTOLIC BLOOD PRESSURE: 94 MMHG

## 2024-03-13 DIAGNOSIS — Z34.90 PREGNANCY, UNSPECIFIED GESTATIONAL AGE: ICD-10-CM

## 2024-03-13 DIAGNOSIS — Z86.718 HISTORY OF DVT (DEEP VEIN THROMBOSIS): ICD-10-CM

## 2024-03-13 DIAGNOSIS — Q51.9 UTERINE ANOMALY: ICD-10-CM

## 2024-03-13 DIAGNOSIS — Q51.9 UTERINE ANOMALY: Primary | ICD-10-CM

## 2024-03-13 DIAGNOSIS — Z34.90 PRENATAL CARE, ANTEPARTUM: Primary | ICD-10-CM

## 2024-03-13 PROBLEM — I82.462 ACUTE DEEP VEIN THROMBOSIS (DVT) OF CALF MUSCLE VEIN OF LEFT LOWER EXTREMITY: Status: RESOLVED | Noted: 2023-07-05 | Resolved: 2024-03-13

## 2024-03-13 LAB
GLUCOSE UR STRIP-MCNC: NEGATIVE MG/DL
PROT UR STRIP-MCNC: NEGATIVE MG/DL

## 2024-03-13 PROCEDURE — 76813 OB US NUCHAL MEAS 1 GEST: CPT

## 2024-03-13 PROCEDURE — 76801 OB US < 14 WKS SINGLE FETUS: CPT

## 2024-03-13 NOTE — PROGRESS NOTES
OB FOLLOW UP  CC- Here for care of pregnancy        Nataliia Lr is a 24 y.o.  12w5d patient being seen today for her obstetrical follow up visit. Patient reports nausea and vomiting. Vomiting multiple times per day, Zofran helps a lot.     Her prenatal care is complicated by (and status) :   Patient Active Problem List   Diagnosis    Right tubo-ovarian mass    Sciatica of left side    Prenatal care, antepartum    History of abnormal cervical Pap smear    History of DVT (deep vein thrombosis)    Uterine anomaly       Genetic testing?: already completed and was normal.  NOB labs reviewed    Ultrasound Today: Yes at [facility]. ? Bicornuate uterus. Anterior placenta and 3vc, NT wnl. Recommended repeat US there in 6 weeks for anatomy.    ROS -   Patient Denies: vaginal bleeding  All other systems reviewed and are negative.     The additional following portions of the patient's history were reviewed and updated as appropriate: allergies, current medications, past family history, past medical history, past social history, past surgical history, and problem list.    I have reviewed and agree with the HPI, ROS, and historical information as entered above. Sierra Nieves MD          BP 94/60   Wt 72.6 kg (160 lb)   LMP 12/15/2023 (Exact Date)   BMI 26.63 kg/m²         EXAM:     Prenatal Vitals  BP: 94/60  Weight: 72.6 kg (160 lb)   Fetal Heart Rate: 142bpm          Urine Glucose Read-only: Negative  Urine Protein Read-only: Negative       Assessment and Plan    Problem List Items Addressed This Visit          Coag and Thromboembolic    History of DVT (deep vein thrombosis)    Overview     2023 status post ovarian cystectomy with Dr. Hector.  5 days later developed a postoperative DVT.  Thrombophilia workup was negative.  Status post Eliquis for 3 months.  Follows with Dr. Merrill.   Recommend prophylactic Lovenox.  Discussed with Dr. Trejo.  Patient reluctant to take Lovenox and would like  to stick with baby aspirin for now.  She will discuss with [facility] when she comes in for appointment.  She understands the risk of clot in pregnancy.  3/13/2024-[facility] recs We would recommend delivery of the patient at 38 weeks gestation through a heparin window. If the patient continues on Lovenox she would need to be off for 24 hours prior to delivery. If she is switched to unfractionated heparin she would need to be off for at least 8 hours after delivery. We would restart anticoagulation 8 hours after delivery and would continue it for 2 to 3 months postpartum          Relevant Medications    Enoxaparin Sodium (LOVENOX) 40 MG/0.4ML solution prefilled syringe syringe       Genitourinary and Reproductive     Uterine anomaly    Overview     Possible bicornuate uterus on US at [facility] at 12 weeks            Gravid and     Prenatal care, antepartum - Primary    Overview     cfDNA negative BOY         Relevant Medications    Enoxaparin Sodium (LOVENOX) 40 MG/0.4ML solution prefilled syringe syringe    Other Relevant Orders    POC Urinalysis Dipstick (Completed)       Pregnancy at 12w5d  Labs reviewed from New OB Visit.  Discussed delivery at 38 weeks recommended by [facility].  Also recommended Lovenox.  Patient reports she seen [facility] as well.  She is undecided if she would like to deliver here or there.   plans fored on genetic testing, carrier status and option for NT screen  Activity and Exercise discussed.  Patient is on Prenatal vitamins  Return in about 4 weeks (around 4/10/2024).    Sierra Nieves MD  2024

## 2024-03-13 NOTE — PROGRESS NOTES
"Documentation of the ultrasound findings, images, and interpretations will be available in the patient's Viewpoint report which is located in the imaging tab in chart review.    Maternal/Fetal Medicine Consult Note     Name: Nataliia Lr    : 2000     MRN: [id number]     Referring Provider: Sierra Nieves MD    Chief Complaint  Uterine anomaly, Hx of DVT     Subjective     History of Present Illness:  Nataliia Lr is a 24 y.o.  12w5d who presents today for uterine anomaly    CRISTINE: Estimated Date of Delivery: 24     ROS:   As noted in HPI.     Past Medical History:   Diagnosis Date    Abnormal Pap smear of cervix     Acute deep vein thrombosis (DVT) of calf muscle vein of left lower extremity 2023    Post-op after right cystectomy in   Follows with hematologist Dr. Merrill       Anticardiolipin antibody positive 2023    COVID-19     History of DVT of lower extremity 2023    post-operative- lower left calf - after ovarian cyst surgery    History of dysmenorrhea     History of endometriosis     History of menorrhagia     History of ovarian cyst     right endometriotic cyst      Past Surgical History:   Procedure Laterality Date    OVARIAN CYST SURGERY Right 2023    Procedure: OVARIAN CYSTECTOMY LAPAROSCOPIC WITH AM [employer] ROBOT RIGHT, PLACEMENT OF ADHESION BARRIER;  Surgeon: Tejal Hector MD;  [facility];  Service: Robotics - [employer];  Laterality: Right;    WISDOM TOOTH EXTRACTION        OB History          1    Para   0    Term   0       0    AB   0    Living   0         SAB   0    IAB   0    Ectopic   0    Molar   0    Multiple   0    Live Births   0          Obstetric Comments   Fob #1 - Pregnancy #1                Objective     Vital Signs  /56   Ht 165.1 cm (65\")   Wt 73.2 kg (161 lb 6.4 oz)   LMP 12/15/2023 (Exact Date)   Estimated body mass index is 26.86 kg/m² as calculated from the following:    Height as of this encounter: " "165.1 cm (65\").    Weight as of this encounter: 73.2 kg (161 lb 6.4 oz).    Physical Exam    Ultrasound Impression:   See Viewpoint    Assessment and Plan     Nataliia Lr is a 24 y.o.  12w5d who presents today for uterine anomaly    Diagnoses and all orders for this visit:    1. Uterine anomaly (Primary)  Assessment & Plan:  Patient referred for suspected uterine septum seen on previous ultrasound.  Patient reports no history of abnormalities.    Ultrasound today demonstrates a normally grown fetus with normal nuchal translucency.  There is a suspicion is of a bicornuate uterus.  No uterine septum was seen.    Bicornuate uterus is associated with an increased risk for  labor.  Patient was counseled regarding the increased risk of  labor and will contact her provider immediately if she notices signs or symptoms of  labor.  We discussed additionally the increased risk for malpresentation of the fetus and the patient understands that this will not be known until the end of pregnancy.    We will rescan the patient for an anatomic survey at 18 to 20 weeks gestation.    Orders:  -     [facility] [place]; Future    2. History of DVT (deep vein thrombosis)  Assessment & Plan:  Patient had a DVT diagnosed 5 days postoperatively after pelvic surgery in .  Additionally the patient has intermediate elevations of IgM anticardiolipin antibodies.    While the initial DVT may have been the result of trauma the vessels related to surgery it is not certain that this is the case as it presented several days after surgery when I combined the possibility that this is a nontrauma related DVT with intermediate elevations of anticardiolipin antibodies I would concur with Dr. Dobson and recommend prophylactic doses of Lovenox.    We would recommend delivery of the patient at 38 weeks gestation through a heparin window.  If the patient continues on Lovenox she would need to be off for 24 " hours prior to delivery.  If she is switched to unfractionated heparin she would need to be off for at least 8 hours after delivery.  We would restart anticoagulation 8 hours after delivery and would continue it for 2 to 3 months postpartum.    Orders:  -     [facility]  Diagnostic Center; Future    3. Pregnancy, unspecified gestational age  -     [facility]  Diagnostic Center; Future         Follow Up  Return in about 6 weeks (around 2024).    I spent 30 minutes caring for the patient on the day of service. This included: obtaining or reviewing a separately obtained medical history, reviewing patient records, performing a medically appropriate exam and/or evaluation, counseling or educating the patient/family/caregiver, ordering medications, labs, and/or procedures and documenting such in the medical record. This does not include time spent on review and interpretation of other tests such as fetal ultrasound or the performance of other procedures such as amniocentesis or CVS.      Douglas A. Milligan, MD  Maternal Fetal Medicine,     [facility]     2024

## 2024-03-13 NOTE — ASSESSMENT & PLAN NOTE
Patient had a DVT diagnosed 5 days postoperatively after pelvic surgery in 2023.  Additionally the patient has intermediate elevations of IgM anticardiolipin antibodies.    While the initial DVT may have been the result of trauma the vessels related to surgery it is not certain that this is the case as it presented several days after surgery when I combined the possibility that this is a nontrauma related DVT with intermediate elevations of anticardiolipin antibodies I would concur with Dr. Dobson and recommend prophylactic doses of Lovenox.    We would recommend delivery of the patient at 38 weeks gestation through a heparin window.  If the patient continues on Lovenox she would need to be off for 24 hours prior to delivery.  If she is switched to unfractionated heparin she would need to be off for at least 8 hours after delivery.  We would restart anticoagulation 8 hours after delivery and would continue it for 2 to 3 months postpartum.

## 2024-03-13 NOTE — ASSESSMENT & PLAN NOTE
Patient referred for suspected uterine septum seen on previous ultrasound.  Patient reports no history of abnormalities.    Ultrasound today demonstrates a normally grown fetus with normal nuchal translucency.  There is a suspicion is of a bicornuate uterus.  No uterine septum was seen.    Bicornuate uterus is associated with an increased risk for  labor.  Patient was counseled regarding the increased risk of  labor and will contact her provider immediately if she notices signs or symptoms of  labor.  We discussed additionally the increased risk for malpresentation of the fetus and the patient understands that this will not be known until the end of pregnancy.    We will rescan the patient for an anatomic survey at 18 to 20 weeks gestation.

## 2024-04-29 ENCOUNTER — LAB (OUTPATIENT)
Dept: LAB | Facility: HOSPITAL | Age: 24
End: 2024-04-29
Payer: COMMERCIAL

## 2024-04-29 ENCOUNTER — OFFICE VISIT (OUTPATIENT)
Dept: ONCOLOGY | Facility: CLINIC | Age: 24
End: 2024-04-29
Payer: COMMERCIAL

## 2024-04-29 VITALS
RESPIRATION RATE: 16 BRPM | OXYGEN SATURATION: 99 % | BODY MASS INDEX: 28.99 KG/M2 | HEART RATE: 91 BPM | TEMPERATURE: 97.7 F | HEIGHT: 65 IN | SYSTOLIC BLOOD PRESSURE: 102 MMHG | WEIGHT: 174 LBS | DIASTOLIC BLOOD PRESSURE: 55 MMHG

## 2024-04-29 DIAGNOSIS — Z86.718 HISTORY OF DVT (DEEP VEIN THROMBOSIS): Primary | ICD-10-CM

## 2024-04-29 DIAGNOSIS — I82.462 ACUTE DEEP VEIN THROMBOSIS (DVT) OF CALF MUSCLE VEIN OF LEFT LOWER EXTREMITY: ICD-10-CM

## 2024-04-29 LAB
BASOPHILS # BLD AUTO: 0.04 10*3/MM3 (ref 0–0.2)
BASOPHILS NFR BLD AUTO: 0.3 % (ref 0–1.5)
D DIMER PPP FEU-MCNC: 2.01 MCGFEU/ML (ref 0–0.5)
DEPRECATED RDW RBC AUTO: 53.5 FL (ref 37–54)
EOSINOPHIL # BLD AUTO: 0.07 10*3/MM3 (ref 0–0.4)
EOSINOPHIL NFR BLD AUTO: 0.6 % (ref 0.3–6.2)
ERYTHROCYTE [DISTWIDTH] IN BLOOD BY AUTOMATED COUNT: 16.2 % (ref 12.3–15.4)
FERRITIN SERPL-MCNC: 15.62 NG/ML (ref 13–150)
HCT VFR BLD AUTO: 35.7 % (ref 34–46.6)
HGB BLD-MCNC: 12.1 G/DL (ref 12–15.9)
IMM GRANULOCYTES # BLD AUTO: 0.14 10*3/MM3 (ref 0–0.05)
IMM GRANULOCYTES NFR BLD AUTO: 1.2 % (ref 0–0.5)
IRON 24H UR-MRATE: 57 MCG/DL (ref 37–145)
IRON SATN MFR SERPL: 13 % (ref 20–50)
LYMPHOCYTES # BLD AUTO: 1.76 10*3/MM3 (ref 0.7–3.1)
LYMPHOCYTES NFR BLD AUTO: 14.9 % (ref 19.6–45.3)
MCH RBC QN AUTO: 30.1 PG (ref 26.6–33)
MCHC RBC AUTO-ENTMCNC: 33.9 G/DL (ref 31.5–35.7)
MCV RBC AUTO: 88.8 FL (ref 79–97)
MONOCYTES # BLD AUTO: 0.67 10*3/MM3 (ref 0.1–0.9)
MONOCYTES NFR BLD AUTO: 5.7 % (ref 5–12)
NEUTROPHILS NFR BLD AUTO: 77.3 % (ref 42.7–76)
NEUTROPHILS NFR BLD AUTO: 9.15 10*3/MM3 (ref 1.7–7)
PLATELET # BLD AUTO: 148 10*3/MM3 (ref 140–450)
PMV BLD AUTO: 12.2 FL (ref 6–12)
RBC # BLD AUTO: 4.02 10*6/MM3 (ref 3.77–5.28)
TIBC SERPL-MCNC: 431 MCG/DL (ref 298–536)
TRANSFERRIN SERPL-MCNC: 289 MG/DL (ref 200–360)
WBC NRBC COR # BLD AUTO: 11.83 10*3/MM3 (ref 3.4–10.8)

## 2024-04-29 PROCEDURE — 85025 COMPLETE CBC W/AUTO DIFF WBC: CPT

## 2024-04-29 PROCEDURE — 84466 ASSAY OF TRANSFERRIN: CPT

## 2024-04-29 PROCEDURE — 99214 OFFICE O/P EST MOD 30 MIN: CPT | Performed by: INTERNAL MEDICINE

## 2024-04-29 PROCEDURE — 86147 CARDIOLIPIN ANTIBODY EA IG: CPT

## 2024-04-29 PROCEDURE — 85379 FIBRIN DEGRADATION QUANT: CPT

## 2024-04-29 PROCEDURE — 1126F AMNT PAIN NOTED NONE PRSNT: CPT | Performed by: INTERNAL MEDICINE

## 2024-04-29 PROCEDURE — 82728 ASSAY OF FERRITIN: CPT

## 2024-04-29 PROCEDURE — 36415 COLL VENOUS BLD VENIPUNCTURE: CPT

## 2024-04-29 PROCEDURE — 83540 ASSAY OF IRON: CPT

## 2024-04-29 PROCEDURE — 82784 ASSAY IGA/IGD/IGG/IGM EACH: CPT

## 2024-04-29 RX ORDER — CETIRIZINE HYDROCHLORIDE 10 MG/1
10 TABLET ORAL DAILY
COMMUNITY
Start: 2024-04-18

## 2024-04-29 NOTE — PROGRESS NOTES
DATE OF VISIT: 4/29/2024    REASON FOR VISIT: Followup for left lower extremity DVT     PROBLEM LIST:  1.  Left below-knee DVT:  A.  Presented with pain and edema left calf area  B.  Diagnosed after venous Doppler done July 3, 2023 confirming DVT in the posterior tibial vein  C.  On Eliquis 5 mg twice daily since July 5, 2023  2.  Positive anticardiolipin antibody:  A.  Antiphospholipid antibody panel revealed intermediate anticardiolipin antibody level of 20.  3.  First trimester pregnancy, 21 weeks    HISTORY OF PRESENT ILLNESS: The patient is a very pleasant 24 y.o. female  with past medical history significant for left lower extremity DVT diagnosed by 2023. The  patient is here today for scheduled follow-up visit.    SUBJECTIVE: Nataliia is here today by herself.  All in all she is doing fairly well.  She denies any fever chills or night sweats.  Denies any bleeding.  She has been compliant with her Lovenox once a day.    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   Surgical History: has a past surgical history that includes [place] tooth extraction and Ovarian cyst surgery (Right, 6/29/2023).   Family History: family history includes Breast cancer (age of onset: 47) in her mother.   Social History: reports that she has never smoked. She has never used smokeless tobacco. She reports that she does not drink alcohol and does not use drugs.    (Not in a hospital admission)     Allergies: Patient has no known allergies.     Review of Systems   Constitutional:  Positive for fatigue.   Hematological: Negative.    Psychiatric/Behavioral:  The patient is nervous/anxious.   "      PHYSICAL EXAMINATION:   /55   Pulse 91   Temp 97.7 °F (36.5 °C) (Temporal)   Resp 16   Ht 165.1 cm (65\")   Wt 78.9 kg (174 lb)   LMP 12/15/2023 (Exact Date)   SpO2 99%   BMI 28.96 kg/m²    Pain Score    04/29/24 1541   PainSc: 0-No pain                        ECOG Performance Status: 1 - Symptomatic but completely ambulatory      General Appearance:      alert, cooperative, no apparent distress and appears stated age   Lungs:   Clear to auscultation bilaterally; respirations regular, even, and unlabored bilaterally   Heart:  Regular rate and rhythm, no murmurs appreciated   Abdomen:   Soft, non-tender, non-distended, and no organomegaly                 Lab on 04/29/2024   Component Date Value Ref Range Status    WBC 04/29/2024 11.83 (H)  3.40 - 10.80 10*3/mm3 Final    RBC 04/29/2024 4.02  3.77 - 5.28 10*6/mm3 Final    Hemoglobin 04/29/2024 12.1  12.0 - 15.9 g/dL Final    Hematocrit 04/29/2024 35.7  34.0 - 46.6 % Final    MCV 04/29/2024 88.8  79.0 - 97.0 fL Final    MCH 04/29/2024 30.1  26.6 - 33.0 pg Final    MCHC 04/29/2024 33.9  31.5 - 35.7 g/dL Final    RDW 04/29/2024 16.2 (H)  12.3 - 15.4 % Final    RDW-SD 04/29/2024 53.5  37.0 - 54.0 fl Final    MPV 04/29/2024 12.2 (H)  6.0 - 12.0 fL Final    Platelets 04/29/2024 148  140 - 450 10*3/mm3 Final    Neutrophil % 04/29/2024 77.3 (H)  42.7 - 76.0 % Final    Lymphocyte % 04/29/2024 14.9 (L)  19.6 - 45.3 % Final    Monocyte % 04/29/2024 5.7  5.0 - 12.0 % Final    Eosinophil % 04/29/2024 0.6  0.3 - 6.2 % Final    Basophil % 04/29/2024 0.3  0.0 - 1.5 % Final    Immature Grans % 04/29/2024 1.2 (H)  0.0 - 0.5 % Final    Neutrophils, Absolute 04/29/2024 9.15 (H)  1.70 - 7.00 10*3/mm3 Final    Lymphocytes, Absolute 04/29/2024 1.76  0.70 - 3.10 10*3/mm3 Final    Monocytes, Absolute 04/29/2024 0.67  0.10 - 0.90 10*3/mm3 Final    Eosinophils, Absolute 04/29/2024 0.07  0.00 - 0.40 10*3/mm3 Final    Basophils, Absolute 04/29/2024 0.04  0.00 - 0.20 10*3/mm3 " Final    Immature Grans, Absolute 04/29/2024 0.14 (H)  0.00 - 0.05 10*3/mm3 Final        No results found.    ASSESSMENT: The patient is a very pleasant 24 y.o. female  with left lower extremity DVT      PLAN:    1.  Left lower extremity below-knee DVT:  A.  She completed 3 months of Eliquis October 2023.  B. Repeated venous U/S showed no evidence of residuary clot.   C.  I did go over the blood work results with the patient from today.  Normal hemoglobin 12.1 normal MCV of 89 and normal platelet count 148 but she did have slightly elevated white blood cells at 11.8.    2.  Positive anticardiolipin antibody:  A.  Repeated testing showed borderline IgM level with negative IgG.    3.  First trimester pregnancy, 21 weeks:  A.   Continue prophylactic Lovenox 40 mg subcu daily.    B.  I will switch her to Lumakras weight heparin around week 36.  C.  She will continue to follow-up with high risk gynecology.  D.  Again the patient asked me about the benefit of Lovenox.  She had 1 episode below-knee DVT with borderline but not positive anticardiolipin IgM antibody.  I think observation alone will be another option however given the fact that she is doing so well with the current low-dose prophylactic Lovenox I would not make any changes at this point.    4.  Mild leukocytosis neutrophilia:  A.  I will repeat her CBC on return.  Her absolute neutrophil count today is 9150.  This could be gestational versus reactive.    FOLLOW UP: 6 weeks with labs.    Deepa Merrill MD  4/29/2024

## 2024-04-30 ENCOUNTER — TELEPHONE (OUTPATIENT)
Dept: ONCOLOGY | Facility: CLINIC | Age: 24
End: 2024-04-30
Payer: COMMERCIAL

## 2024-04-30 LAB
IGA1 MFR SER: 71 MG/DL (ref 70–400)
IGG1 SER-MCNC: 1063 MG/DL (ref 700–1600)
IGM SERPL-MCNC: 282 MG/DL (ref 40–230)

## 2024-04-30 NOTE — TELEPHONE ENCOUNTER
Called patient's father back. He is confused as to why Dr. Merrill wants her to stay on the lovenox when he said yesterday that she could come off of it. Explained that Dr. Merrill gave her the option at the beginning of her pregnancy whether to start a blood thinner or not, and her OB is the one who put her on the lovenox. He did not find it 100% necessary, but her OB did. Recommended that they talk to her OB about this issue if she does not want to stay on the lovenox.     Ismael would like to talk to Dr. Merrill. Let Dr. Merrill know and he will call him after he is done with patients in clinic.

## 2024-04-30 NOTE — TELEPHONE ENCOUNTER
Called patient back and let her know that Dr. Merrill does not recommend that she comes off of her lovenox since she is doing so well. Recommended that she talk to her high risk OB and see if they think she can come off of it. If they are okay with it, she does not have to wean off the lovenox, she just stops taking it. Patient v/u and appreciation.

## 2024-05-01 LAB
CARDIOLIPIN IGG SER IA-ACNC: <9 GPL U/ML (ref 0–14)
CARDIOLIPIN IGM SER IA-ACNC: <9 MPL U/ML (ref 0–12)

## 2024-05-02 ENCOUNTER — TELEPHONE (OUTPATIENT)
Dept: OBSTETRICS AND GYNECOLOGY | Facility: HOSPITAL | Age: 24
End: 2024-05-02
Payer: COMMERCIAL

## 2024-05-02 ENCOUNTER — TELEPHONE (OUTPATIENT)
Dept: ONCOLOGY | Facility: CLINIC | Age: 24
End: 2024-05-02
Payer: COMMERCIAL

## 2024-05-02 NOTE — TELEPHONE ENCOUNTER
Called patient and let her know that her anticardiolipin antibodies came back negative, so Dr. Merrill is more comfortable about patient coming off lovenox as long as her high-risk OB is in agreement with that. Told her to not come off of it if her OB wants her to stay on it. Patient v/u and appreciation.

## 2024-05-02 NOTE — TELEPHONE ENCOUNTER
"Returned pt's message regarding lovenox therapy. Pt states having a DVT after a procedure. She is currently taking lovenox while pregnant. She states her hematologist told her she no longer needs to take lovenox during her pregnancy, and pt wants second opinion. She is transferring OB care to  per preference on Monday. Dr Trejo recommends the pt continue on her prescribed lovenox due to increased clotting factors associated with pregnancy and hx DVT. Pt v/u but stated she agreed with her hematologist because \"I only got the DVT after my surgery. I don't think it will happen again.\" Advised client to discuss her concerns with pros/cons with her new OB provider on Monday in order to feel comfortable with her care.   "

## 2024-05-02 NOTE — TELEPHONE ENCOUNTER
WANTS ADVICE RE: BLOOD THINNERS ON LOVENOX FOR HX OF DVT, ANTICARDIOLIPIN ANTIBODIES CAME BACK NEGATIVE. CAN SHE COME OFF OF THE LOVENOX? NOT SCHEDULED TO SEE US AGAIN, SEEING [facility].

## 2024-06-10 DIAGNOSIS — Z86.718 HISTORY OF DVT (DEEP VEIN THROMBOSIS): Primary | ICD-10-CM

## 2024-06-11 ENCOUNTER — OFFICE VISIT (OUTPATIENT)
Dept: ONCOLOGY | Facility: CLINIC | Age: 24
End: 2024-06-11
Payer: COMMERCIAL

## 2024-06-11 ENCOUNTER — LAB (OUTPATIENT)
Dept: LAB | Facility: HOSPITAL | Age: 24
End: 2024-06-11
Payer: COMMERCIAL

## 2024-06-11 VITALS
OXYGEN SATURATION: 100 % | HEIGHT: 65 IN | TEMPERATURE: 97.6 F | BODY MASS INDEX: 29.66 KG/M2 | RESPIRATION RATE: 16 BRPM | HEART RATE: 108 BPM | WEIGHT: 178 LBS | SYSTOLIC BLOOD PRESSURE: 111 MMHG | DIASTOLIC BLOOD PRESSURE: 74 MMHG

## 2024-06-11 DIAGNOSIS — Z86.718 HISTORY OF DVT (DEEP VEIN THROMBOSIS): Primary | ICD-10-CM

## 2024-06-11 DIAGNOSIS — Z86.718 HISTORY OF DVT (DEEP VEIN THROMBOSIS): ICD-10-CM

## 2024-06-11 LAB
BASOPHILS # BLD AUTO: 0.03 10*3/MM3 (ref 0–0.2)
BASOPHILS NFR BLD AUTO: 0.2 % (ref 0–1.5)
DEPRECATED RDW RBC AUTO: 48.9 FL (ref 37–54)
EOSINOPHIL # BLD AUTO: 0.07 10*3/MM3 (ref 0–0.4)
EOSINOPHIL NFR BLD AUTO: 0.6 % (ref 0.3–6.2)
ERYTHROCYTE [DISTWIDTH] IN BLOOD BY AUTOMATED COUNT: 14.4 % (ref 12.3–15.4)
FERRITIN SERPL-MCNC: 17.76 NG/ML (ref 13–150)
HCT VFR BLD AUTO: 37.2 % (ref 34–46.6)
HGB BLD-MCNC: 12.4 G/DL (ref 12–15.9)
IMM GRANULOCYTES # BLD AUTO: 0.27 10*3/MM3 (ref 0–0.05)
IMM GRANULOCYTES NFR BLD AUTO: 2.2 % (ref 0–0.5)
LYMPHOCYTES # BLD AUTO: 1.45 10*3/MM3 (ref 0.7–3.1)
LYMPHOCYTES NFR BLD AUTO: 11.8 % (ref 19.6–45.3)
MCH RBC QN AUTO: 30.8 PG (ref 26.6–33)
MCHC RBC AUTO-ENTMCNC: 33.3 G/DL (ref 31.5–35.7)
MCV RBC AUTO: 92.5 FL (ref 79–97)
MONOCYTES # BLD AUTO: 0.52 10*3/MM3 (ref 0.1–0.9)
MONOCYTES NFR BLD AUTO: 4.2 % (ref 5–12)
NEUTROPHILS NFR BLD AUTO: 10 10*3/MM3 (ref 1.7–7)
NEUTROPHILS NFR BLD AUTO: 81 % (ref 42.7–76)
PLATELET # BLD AUTO: 148 10*3/MM3 (ref 140–450)
PMV BLD AUTO: 11.7 FL (ref 6–12)
RBC # BLD AUTO: 4.02 10*6/MM3 (ref 3.77–5.28)
WBC NRBC COR # BLD AUTO: 12.34 10*3/MM3 (ref 3.4–10.8)

## 2024-06-11 PROCEDURE — 85025 COMPLETE CBC W/AUTO DIFF WBC: CPT

## 2024-06-11 PROCEDURE — 99214 OFFICE O/P EST MOD 30 MIN: CPT | Performed by: INTERNAL MEDICINE

## 2024-06-11 PROCEDURE — 83540 ASSAY OF IRON: CPT

## 2024-06-11 PROCEDURE — 82728 ASSAY OF FERRITIN: CPT

## 2024-06-11 PROCEDURE — 36415 COLL VENOUS BLD VENIPUNCTURE: CPT

## 2024-06-11 PROCEDURE — 84466 ASSAY OF TRANSFERRIN: CPT

## 2024-06-11 PROCEDURE — 1126F AMNT PAIN NOTED NONE PRSNT: CPT | Performed by: INTERNAL MEDICINE

## 2024-06-11 NOTE — PROGRESS NOTES
DATE OF VISIT: 6/11/2024    REASON FOR VISIT: Followup for left lower extremity DVT     PROBLEM LIST:  1.  Left below-knee DVT:  A.  Presented with pain and edema left calf area  B.  Diagnosed after venous Doppler done July 3, 2023 confirming DVT in the posterior tibial vein  C.  On Eliquis 5 mg twice daily since July 5, 2023  2.  Positive anticardiolipin antibody:  A.  Antiphospholipid antibody panel revealed intermediate anticardiolipin antibody level of 20.  3.  First trimester pregnancy, 21 weeks    HISTORY OF PRESENT ILLNESS: The patient is a very pleasant 24 y.o. female  with past medical history significant for left lower extremity DVT diagnosed by 2023. The  patient is here today for scheduled follow-up visit.    SUBJECTIVE: Nataliia is here today by herself all in all she is doing well.  She is having minor bruises.  No active bleeding.    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   Surgical History: has a past surgical history that includes [place] tooth extraction and Ovarian cyst surgery (Right, 6/29/2023).   Family History: family history includes Breast cancer (age of onset: 47) in her mother.   Social History: reports that she has never smoked. She has never used smokeless tobacco. She reports that she does not drink alcohol and does not use drugs.    (Not in a hospital admission)     Allergies: Patient has no known allergies.     Review of Systems   Constitutional:  Positive for fatigue.   Hematological: Negative.    Psychiatric/Behavioral:  The patient is nervous/anxious.        PHYSICAL EXAMINATION:   /74   Pulse 108   Temp 97.6 °F (36.4 °C)   Resp  "16   Ht 165.1 cm (65\")   Wt 80.7 kg (178 lb)   LMP 12/15/2023 (Exact Date)   SpO2 100%   BMI 29.62 kg/m²    Pain Score    06/11/24 1513   PainSc: 0-No pain                          ECOG Performance Status: 1 - Symptomatic but completely ambulatory      General Appearance:      alert, cooperative, no apparent distress and appears stated age   Lungs:   Clear to auscultation bilaterally; respirations regular, even, and unlabored bilaterally   Heart:  Regular rate and rhythm, no murmurs appreciated   Abdomen:   Soft, non-tender, non-distended, and no organomegaly                 Lab on 06/11/2024   Component Date Value Ref Range Status    WBC 06/11/2024 12.34 (H)  3.40 - 10.80 10*3/mm3 Final    RBC 06/11/2024 4.02  3.77 - 5.28 10*6/mm3 Final    Hemoglobin 06/11/2024 12.4  12.0 - 15.9 g/dL Final    Hematocrit 06/11/2024 37.2  34.0 - 46.6 % Final    MCV 06/11/2024 92.5  79.0 - 97.0 fL Final    MCH 06/11/2024 30.8  26.6 - 33.0 pg Final    MCHC 06/11/2024 33.3  31.5 - 35.7 g/dL Final    RDW 06/11/2024 14.4  12.3 - 15.4 % Final    RDW-SD 06/11/2024 48.9  37.0 - 54.0 fl Final    MPV 06/11/2024 11.7  6.0 - 12.0 fL Final    Platelets 06/11/2024 148  140 - 450 10*3/mm3 Final    Neutrophil % 06/11/2024 81.0 (H)  42.7 - 76.0 % Final    Lymphocyte % 06/11/2024 11.8 (L)  19.6 - 45.3 % Final    Monocyte % 06/11/2024 4.2 (L)  5.0 - 12.0 % Final    Eosinophil % 06/11/2024 0.6  0.3 - 6.2 % Final    Basophil % 06/11/2024 0.2  0.0 - 1.5 % Final    Immature Grans % 06/11/2024 2.2 (H)  0.0 - 0.5 % Final    Neutrophils, Absolute 06/11/2024 10.00 (H)  1.70 - 7.00 10*3/mm3 Final    Lymphocytes, Absolute 06/11/2024 1.45  0.70 - 3.10 10*3/mm3 Final    Monocytes, Absolute 06/11/2024 0.52  0.10 - 0.90 10*3/mm3 Final    Eosinophils, Absolute 06/11/2024 0.07  0.00 - 0.40 10*3/mm3 Final    Basophils, Absolute 06/11/2024 0.03  0.00 - 0.20 10*3/mm3 Final    Immature Grans, Absolute 06/11/2024 0.27 (H)  0.00 - 0.05 10*3/mm3 Final        No results " found.    ASSESSMENT: The patient is a very pleasant 24 y.o. female  with left lower extremity DVT      PLAN:    1.  Left lower extremity below-knee DVT:  A.  She completed 3 months of Eliquis October 2023.  B.  Repeated venous U/S showed no evidence of residuary clot.   C.  I did go over the blood work results with the patient from today.  Normal hemoglobin 12.1 normal MCV of 89 and normal platelet count 148 but she did have slightly elevated white blood cells at 11.8.    2.  Transient positive anticardiolipin antibody:  A.  Repeated testing showed negative IgM and IgG anticardiolipin antibodies on April 29, 2024.    3.  Second trimester pregnancy, 27 weeks:  A.   Continue prophylactic Lovenox 40 mg subcu daily.    B.  I will switch her to low molecular weight heparin around week 36.  C.  She will continue to follow-up with high risk gynecology.  D.  Again the patient asked me about the benefit of Lovenox.  She had 1 episode below-knee DVT with borderline but not positive anticardiolipin IgM antibody.  Repeated testing of her anticoagulant antibodies came back negative April 29, 2024.  I think observation alone will be another option however given the fact that she is doing so well with the current low-dose prophylactic Lovenox I would not make any changes at this point.    4.  Mild leukocytosis neutrophilia:  A.  I did go over the CBC result the patient from today white cells are slightly elevated but stable at 12.3.  Mainly neutrophilia.  I will repeat this on return.    FOLLOW UP: 6 weeks with labs.    Deepa Merrill MD  6/11/2024

## 2024-06-12 LAB
IRON 24H UR-MRATE: 67 MCG/DL (ref 37–145)
IRON SATN MFR SERPL: 14 % (ref 20–50)
TIBC SERPL-MCNC: 496 MCG/DL (ref 298–536)
TRANSFERRIN SERPL-MCNC: 333 MG/DL (ref 200–360)

## 2024-08-13 ENCOUNTER — OFFICE VISIT (OUTPATIENT)
Dept: ONCOLOGY | Facility: CLINIC | Age: 24
End: 2024-08-13
Payer: COMMERCIAL

## 2024-08-13 ENCOUNTER — LAB (OUTPATIENT)
Dept: LAB | Facility: HOSPITAL | Age: 24
End: 2024-08-13
Payer: COMMERCIAL

## 2024-08-13 VITALS
RESPIRATION RATE: 16 BRPM | BODY MASS INDEX: 32.49 KG/M2 | TEMPERATURE: 97.5 F | DIASTOLIC BLOOD PRESSURE: 74 MMHG | HEART RATE: 95 BPM | SYSTOLIC BLOOD PRESSURE: 112 MMHG | HEIGHT: 65 IN | OXYGEN SATURATION: 99 % | WEIGHT: 195 LBS

## 2024-08-13 DIAGNOSIS — Z86.718 HISTORY OF DVT (DEEP VEIN THROMBOSIS): ICD-10-CM

## 2024-08-13 DIAGNOSIS — Z86.718 HISTORY OF DVT (DEEP VEIN THROMBOSIS): Primary | ICD-10-CM

## 2024-08-13 LAB
BASOPHILS # BLD AUTO: 0.04 10*3/MM3 (ref 0–0.2)
BASOPHILS NFR BLD AUTO: 0.3 % (ref 0–1.5)
DEPRECATED RDW RBC AUTO: 45.9 FL (ref 37–54)
EOSINOPHIL # BLD AUTO: 0.07 10*3/MM3 (ref 0–0.4)
EOSINOPHIL NFR BLD AUTO: 0.6 % (ref 0.3–6.2)
ERYTHROCYTE [DISTWIDTH] IN BLOOD BY AUTOMATED COUNT: 14.1 % (ref 12.3–15.4)
FERRITIN SERPL-MCNC: 13.28 NG/ML (ref 13–150)
HCT VFR BLD AUTO: 34.4 % (ref 34–46.6)
HGB BLD-MCNC: 11.6 G/DL (ref 12–15.9)
IMM GRANULOCYTES # BLD AUTO: 0.26 10*3/MM3 (ref 0–0.05)
IMM GRANULOCYTES NFR BLD AUTO: 2.2 % (ref 0–0.5)
IRON 24H UR-MRATE: 56 MCG/DL (ref 37–145)
IRON SATN MFR SERPL: 11 % (ref 20–50)
LYMPHOCYTES # BLD AUTO: 1.78 10*3/MM3 (ref 0.7–3.1)
LYMPHOCYTES NFR BLD AUTO: 15.2 % (ref 19.6–45.3)
MCH RBC QN AUTO: 30.1 PG (ref 26.6–33)
MCHC RBC AUTO-ENTMCNC: 33.7 G/DL (ref 31.5–35.7)
MCV RBC AUTO: 89.4 FL (ref 79–97)
MONOCYTES # BLD AUTO: 1.04 10*3/MM3 (ref 0.1–0.9)
MONOCYTES NFR BLD AUTO: 8.9 % (ref 5–12)
NEUTROPHILS NFR BLD AUTO: 72.8 % (ref 42.7–76)
NEUTROPHILS NFR BLD AUTO: 8.54 10*3/MM3 (ref 1.7–7)
PLATELET # BLD AUTO: 146 10*3/MM3 (ref 140–450)
PMV BLD AUTO: 12.2 FL (ref 6–12)
RBC # BLD AUTO: 3.85 10*6/MM3 (ref 3.77–5.28)
TIBC SERPL-MCNC: 492 MCG/DL (ref 298–536)
TRANSFERRIN SERPL-MCNC: 330 MG/DL (ref 200–360)
WBC NRBC COR # BLD AUTO: 11.73 10*3/MM3 (ref 3.4–10.8)

## 2024-08-13 PROCEDURE — 83540 ASSAY OF IRON: CPT

## 2024-08-13 PROCEDURE — 36415 COLL VENOUS BLD VENIPUNCTURE: CPT

## 2024-08-13 PROCEDURE — 99214 OFFICE O/P EST MOD 30 MIN: CPT | Performed by: INTERNAL MEDICINE

## 2024-08-13 PROCEDURE — 1126F AMNT PAIN NOTED NONE PRSNT: CPT | Performed by: INTERNAL MEDICINE

## 2024-08-13 PROCEDURE — 82728 ASSAY OF FERRITIN: CPT

## 2024-08-13 PROCEDURE — 85025 COMPLETE CBC W/AUTO DIFF WBC: CPT

## 2024-08-13 PROCEDURE — 84466 ASSAY OF TRANSFERRIN: CPT

## 2024-08-13 NOTE — PROGRESS NOTES
DATE OF VISIT: 8/13/2024    REASON FOR VISIT: Followup for left lower extremity DVT     PROBLEM LIST:  1.  Left below-knee DVT:  A.  Presented with pain and edema left calf area  B.  Diagnosed after venous Doppler done July 3, 2023 confirming DVT in the posterior tibial vein  C.  On Eliquis 5 mg twice daily since July 5, 2023  2.  Positive anticardiolipin antibody:  A.  Antiphospholipid antibody panel revealed intermediate anticardiolipin antibody level of 20.  3.  First trimester pregnancy, 21 weeks    HISTORY OF PRESENT ILLNESS: The patient is a very pleasant 24 y.o. female  with past medical history significant for left lower extremity DVT diagnosed by 2023. The  patient is here today for scheduled follow-up visit.    SUBJECTIVE: Nataliia is here today with her mother-in-law.  All in all she is doing well.  Denies any bleeding.    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   Surgical History: has a past surgical history that includes [place] tooth extraction and Ovarian cyst surgery (Right, 6/29/2023).   Family History: family history includes Breast cancer (age of onset: 47) in her mother.   Social History: reports that she has never smoked. She has never used smokeless tobacco. She reports that she does not drink alcohol and does not use drugs.    (Not in a hospital admission)     Allergies: Patient has no known allergies.     Review of Systems   Constitutional:  Positive for fatigue.   Hematological: Negative.    Psychiatric/Behavioral:  The patient is nervous/anxious.        PHYSICAL EXAMINATION:   /74   Pulse 95   Temp 97.5 °F (36.4 °C)   Resp 16   Ht 165.1  "cm (65\")   Wt 88.5 kg (195 lb)   LMP 12/15/2023 (Exact Date)   SpO2 99%   BMI 32.45 kg/m²    Pain Score    08/13/24 1426   PainSc: 0-No pain                            ECOG Performance Status: 1 - Symptomatic but completely ambulatory      General Appearance:      alert, cooperative, no apparent distress and appears stated age   Lungs:   Clear to auscultation bilaterally; respirations regular, even, and unlabored bilaterally   Heart:  Regular rate and rhythm, no murmurs appreciated   Abdomen:   Soft, non-tender, non-distended, and no organomegaly                 Lab on 08/13/2024   Component Date Value Ref Range Status    WBC 08/13/2024 11.73 (H)  3.40 - 10.80 10*3/mm3 Final    RBC 08/13/2024 3.85  3.77 - 5.28 10*6/mm3 Final    Hemoglobin 08/13/2024 11.6 (L)  12.0 - 15.9 g/dL Final    Hematocrit 08/13/2024 34.4  34.0 - 46.6 % Final    MCV 08/13/2024 89.4  79.0 - 97.0 fL Final    MCH 08/13/2024 30.1  26.6 - 33.0 pg Final    MCHC 08/13/2024 33.7  31.5 - 35.7 g/dL Final    RDW 08/13/2024 14.1  12.3 - 15.4 % Final    RDW-SD 08/13/2024 45.9  37.0 - 54.0 fl Final    MPV 08/13/2024 12.2 (H)  6.0 - 12.0 fL Final    Platelets 08/13/2024 146  140 - 450 10*3/mm3 Final    Neutrophil % 08/13/2024 72.8  42.7 - 76.0 % Final    Lymphocyte % 08/13/2024 15.2 (L)  19.6 - 45.3 % Final    Monocyte % 08/13/2024 8.9  5.0 - 12.0 % Final    Eosinophil % 08/13/2024 0.6  0.3 - 6.2 % Final    Basophil % 08/13/2024 0.3  0.0 - 1.5 % Final    Immature Grans % 08/13/2024 2.2 (H)  0.0 - 0.5 % Final    Neutrophils, Absolute 08/13/2024 8.54 (H)  1.70 - 7.00 10*3/mm3 Final    Lymphocytes, Absolute 08/13/2024 1.78  0.70 - 3.10 10*3/mm3 Final    Monocytes, Absolute 08/13/2024 1.04 (H)  0.10 - 0.90 10*3/mm3 Final    Eosinophils, Absolute 08/13/2024 0.07  0.00 - 0.40 10*3/mm3 Final    Basophils, Absolute 08/13/2024 0.04  0.00 - 0.20 10*3/mm3 Final    Immature Grans, Absolute 08/13/2024 0.26 (H)  0.00 - 0.05 10*3/mm3 Final        US Ob Limited 1 + " Fetuses    Result Date: 7/29/2024  Narrative: This result has an attachment that is not available.    Impression: The OB Ultrasound you requested has been resulted. Please navigate to the Imaging tab in Epic for review. This message has been generated by the interface.     ASSESSMENT: The patient is a very pleasant 24 y.o. female  with left lower extremity DVT      PLAN:    1.  Left lower extremity below-knee DVT:  A.  She completed 3 months of Eliquis October 2023.  B.  Repeated venous U/S showed no evidence of residuary clot.   C.  She is currently on Lovenox prophylaxis 40 mg subcu daily.    2.  Transient positive anticardiolipin antibody:  A.  Repeated testing showed negative IgM and IgG anticardiolipin antibodies on April 29, 2024.    3.  Second trimester pregnancy, 35 weeks:  A.   Continue prophylactic Lovenox 40 mg subcu daily.    B.  I will switch her to low molecular weight heparin 5000 units subcu twice a day around week 36.  C.  She will continue to follow-up with high risk gynecology.  D.  Again the patient asked me about the benefit of Lovenox.  She had 1 episode below-knee DVT with borderline but not positive anticardiolipin IgM antibody.  Repeated testing of her anticoagulant antibodies came back negative April 29, 2024.  I think observation alone will be another option however given the fact that she is doing so well with the current low-dose prophylactic Lovenox I would not make any changes at this point.  E.  I recommend to stay on Lovenox prophylactic dose of 40 mg subcu daily 6 weeks postpartum and then stop.    4.  Mild leukocytosis neutrophilia:  A.  I did go over the CBC result the patient from today her white cells are slightly elevated but stable.  Mild anemia but hemoglobin above 11.  B.  I will repeat her labs on return.    FOLLOW UP: 3 months with labs.    Deepa Merrill MD  8/13/2024

## 2024-09-20 ENCOUNTER — READMISSION MANAGEMENT (OUTPATIENT)
Dept: CALL CENTER | Facility: HOSPITAL | Age: 24
End: 2024-09-20
Payer: COMMERCIAL

## 2024-09-24 ENCOUNTER — TELEPHONE (OUTPATIENT)
Dept: ONCOLOGY | Facility: CLINIC | Age: 24
End: 2024-09-24

## 2024-10-23 ENCOUNTER — APPOINTMENT (OUTPATIENT)
Dept: ULTRASOUND IMAGING | Facility: HOSPITAL | Age: 24
End: 2024-10-23
Payer: COMMERCIAL

## 2024-10-23 ENCOUNTER — HOSPITAL ENCOUNTER (EMERGENCY)
Facility: HOSPITAL | Age: 24
Discharge: HOME | End: 2024-10-23
Attending: EMERGENCY MEDICINE | Admitting: EMERGENCY MEDICINE
Payer: COMMERCIAL

## 2024-10-23 VITALS
HEIGHT: 64 IN | SYSTOLIC BLOOD PRESSURE: 109 MMHG | OXYGEN SATURATION: 97 % | RESPIRATION RATE: 16 BRPM | TEMPERATURE: 98.7 F | HEART RATE: 87 BPM | DIASTOLIC BLOOD PRESSURE: 81 MMHG | WEIGHT: 175 LBS | BODY MASS INDEX: 29.88 KG/M2

## 2024-10-23 DIAGNOSIS — N93.9 VAGINAL BLEEDING: Primary | ICD-10-CM

## 2024-10-23 LAB
ALBUMIN SERPL-MCNC: 4 G/DL (ref 3.5–5.2)
ALBUMIN/GLOB SERPL: 1.4 G/DL
ALP SERPL-CCNC: 95 U/L (ref 39–117)
ALT SERPL W P-5'-P-CCNC: 12 U/L (ref 1–33)
ANION GAP SERPL CALCULATED.3IONS-SCNC: 6 MMOL/L (ref 5–15)
AST SERPL-CCNC: 17 U/L (ref 1–32)
BACTERIA UR QL AUTO: ABNORMAL /HPF
BASOPHILS # BLD AUTO: 0.05 10*3/MM3 (ref 0–0.2)
BASOPHILS NFR BLD AUTO: 0.8 % (ref 0–1.5)
BILIRUB SERPL-MCNC: 0.9 MG/DL (ref 0–1.2)
BILIRUB UR QL STRIP: NEGATIVE
BUN SERPL-MCNC: 10 MG/DL (ref 6–20)
BUN/CREAT SERPL: 13 (ref 7–25)
CALCIUM SPEC-SCNC: 8.7 MG/DL (ref 8.6–10.5)
CHLORIDE SERPL-SCNC: 106 MMOL/L (ref 98–107)
CLARITY UR: CLEAR
CO2 SERPL-SCNC: 24 MMOL/L (ref 22–29)
COLOR UR: ABNORMAL
CREAT SERPL-MCNC: 0.77 MG/DL (ref 0.57–1)
DEPRECATED RDW RBC AUTO: 44.1 FL (ref 37–54)
EGFRCR SERPLBLD CKD-EPI 2021: 109.3 ML/MIN/1.73
EOSINOPHIL # BLD AUTO: 0.29 10*3/MM3 (ref 0–0.4)
EOSINOPHIL NFR BLD AUTO: 4.5 % (ref 0.3–6.2)
ERYTHROCYTE [DISTWIDTH] IN BLOOD BY AUTOMATED COUNT: 14 % (ref 12.3–15.4)
GLOBULIN UR ELPH-MCNC: 2.9 GM/DL
GLUCOSE SERPL-MCNC: 87 MG/DL (ref 65–99)
GLUCOSE UR STRIP-MCNC: NEGATIVE MG/DL
HCT VFR BLD AUTO: 36.1 % (ref 34–46.6)
HGB BLD-MCNC: 11.2 G/DL (ref 12–15.9)
HGB UR QL STRIP.AUTO: ABNORMAL
HYALINE CASTS UR QL AUTO: ABNORMAL /LPF
IMM GRANULOCYTES # BLD AUTO: 0.02 10*3/MM3 (ref 0–0.05)
IMM GRANULOCYTES NFR BLD AUTO: 0.3 % (ref 0–0.5)
INR PPP: 1.11 (ref 0.89–1.12)
KETONES UR QL STRIP: NEGATIVE
LEUKOCYTE ESTERASE UR QL STRIP.AUTO: NEGATIVE
LYMPHOCYTES # BLD AUTO: 2.21 10*3/MM3 (ref 0.7–3.1)
LYMPHOCYTES NFR BLD AUTO: 34.6 % (ref 19.6–45.3)
MCH RBC QN AUTO: 26.6 PG (ref 26.6–33)
MCHC RBC AUTO-ENTMCNC: 31 G/DL (ref 31.5–35.7)
MCV RBC AUTO: 85.7 FL (ref 79–97)
MONOCYTES # BLD AUTO: 0.56 10*3/MM3 (ref 0.1–0.9)
MONOCYTES NFR BLD AUTO: 8.8 % (ref 5–12)
NEUTROPHILS NFR BLD AUTO: 3.25 10*3/MM3 (ref 1.7–7)
NEUTROPHILS NFR BLD AUTO: 51 % (ref 42.7–76)
NITRITE UR QL STRIP: NEGATIVE
NRBC BLD AUTO-RTO: 0 /100 WBC (ref 0–0.2)
PH UR STRIP.AUTO: 5.5 [PH] (ref 5–8)
PLATELET # BLD AUTO: 211 10*3/MM3 (ref 140–450)
PMV BLD AUTO: 12.2 FL (ref 6–12)
POTASSIUM SERPL-SCNC: 4 MMOL/L (ref 3.5–5.2)
PROT SERPL-MCNC: 6.9 G/DL (ref 6–8.5)
PROT UR QL STRIP: NEGATIVE
PROTHROMBIN TIME: 14.5 SECONDS (ref 12.2–14.5)
RBC # BLD AUTO: 4.21 10*6/MM3 (ref 3.77–5.28)
RBC # UR STRIP: ABNORMAL /HPF
REF LAB TEST METHOD: ABNORMAL
SODIUM SERPL-SCNC: 136 MMOL/L (ref 136–145)
SP GR UR STRIP: 1.01 (ref 1–1.03)
SQUAMOUS #/AREA URNS HPF: ABNORMAL /HPF
UROBILINOGEN UR QL STRIP: ABNORMAL
WBC # UR STRIP: ABNORMAL /HPF
WBC NRBC COR # BLD AUTO: 6.38 10*3/MM3 (ref 3.4–10.8)

## 2024-10-23 PROCEDURE — 81001 URINALYSIS AUTO W/SCOPE: CPT | Performed by: EMERGENCY MEDICINE

## 2024-10-23 PROCEDURE — 80053 COMPREHEN METABOLIC PANEL: CPT | Performed by: EMERGENCY MEDICINE

## 2024-10-23 PROCEDURE — 99284 EMERGENCY DEPT VISIT MOD MDM: CPT

## 2024-10-23 PROCEDURE — 76830 TRANSVAGINAL US NON-OB: CPT

## 2024-10-23 PROCEDURE — 85025 COMPLETE CBC W/AUTO DIFF WBC: CPT | Performed by: EMERGENCY MEDICINE

## 2024-10-23 PROCEDURE — 85610 PROTHROMBIN TIME: CPT | Performed by: EMERGENCY MEDICINE

## 2024-10-23 PROCEDURE — 36415 COLL VENOUS BLD VENIPUNCTURE: CPT

## 2024-10-23 RX ORDER — SODIUM CHLORIDE 0.9 % (FLUSH) 0.9 %
10 SYRINGE (ML) INJECTION AS NEEDED
Status: DISCONTINUED | OUTPATIENT
Start: 2024-10-23 | End: 2024-10-24 | Stop reason: HOSPADM

## 2024-10-23 NOTE — ED PROVIDER NOTES
Subjective   History of Present Illness  24-year-old female who presents with a complaint of abdominal pain with associated vaginal bleeding.  The patient reports that she is currently 5 weeks postpartum.  She had a  at the [facility].  She does report that the complications and the baby ultimately passed away.  She reports that her vaginal bleeding has been tapering off appropriately until Friday, when she began to have increased bleeding.  She reports that she is now changing a pad or roughly every 1-2 hours.  She does report that she currently is taking Xarelto.  She states that in 2023 she was diagnosed with a DVT after she had surgery on her abdomen for cyst removal.  She completed Eliquis for 3 months.  However when she got pregnant with the recent pregnancy she was initiated on Lovenox as prophylaxis against infection.  Now postpartum she has been placed on Xarelto to prevent secondary DVT.  She continues to take it at this given time.  No leg pain or swelling.  No chest pain cough or shortness of breath.  No fever or infectious symptoms.  No sick contacts.  No other acute complaints.  She has been previously followed by Dr. Dobson of the OB service here at [facility].      Review of Systems   Constitutional:  Negative for chills, fatigue and fever.   HENT:  Negative for congestion, ear pain, postnasal drip, sinus pressure and sore throat.    Eyes:  Negative for pain, redness and visual disturbance.   Respiratory:  Negative for cough, chest tightness and shortness of breath.    Cardiovascular:  Negative for chest pain, palpitations and leg swelling.   Gastrointestinal:  Negative for abdominal pain, anal bleeding, blood in stool, diarrhea, nausea and vomiting.   Endocrine: Negative for polydipsia and polyuria.   Genitourinary:  Positive for vaginal bleeding. Negative for difficulty urinating, dysuria, frequency and urgency.   Musculoskeletal:  Negative for arthralgias, back  pain and neck pain.   Skin:  Negative for pallor and rash.   Allergic/Immunologic: Negative for environmental allergies and immunocompromised state.   Neurological:  Negative for dizziness, weakness and headaches.   Hematological:  Negative for adenopathy.   Psychiatric/Behavioral:  Negative for confusion, self-injury and suicidal ideas. The patient is not nervous/anxious.    All other systems reviewed and are negative.      Past Medical History:   Diagnosis Date    Abnormal Pap smear of cervix     Acute deep vein thrombosis (DVT) of calf muscle vein of left lower extremity 07/05/2023    Post-op after right cystectomy in 2023  Follows with hematologist Dr. 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    COVID-19     History of DVT of lower extremity 07/03/2023    post-operative- lower left calf - after ovarian cyst surgery    History of dysmenorrhea     History of endometriosis     History of menorrhagia     History of ovarian cyst     right endometriotic cyst       No Known Allergies    Past Surgical History:   Procedure Laterality Date    OVARIAN CYST SURGERY Right 6/29/2023    Procedure: OVARIAN CYSTECTOMY LAPAROSCOPIC WITH [employer] ROBOT RIGHT, PLACEMENT OF ADHESION BARRIER;  Surgeon: Tejal Hector MD;  Location: [facility];  Service: Robotics - [employer];  Laterality: Right;    WISDOM TOOTH EXTRACTION         Family History   Problem Relation Age of Onset    Breast cancer Mother 47    Ovarian cancer Neg Hx     Uterine cancer Neg Hx     Colon cancer Neg Hx        Social History     Socioeconomic History    Marital status:    Tobacco Use    Smoking status: Never    Smokeless tobacco: Never   Vaping Use    Vaping status: Never Used   Substance and Sexual Activity    Alcohol use: Never    Drug use: Never    Sexual activity: Yes           Objective   Physical Exam  Vitals and nursing note reviewed.   Constitutional:       General: She is not in acute distress.     Appearance: Normal appearance. She  is well-developed. She is not toxic-appearing or diaphoretic.   HENT:      Head: Normocephalic and atraumatic.      Right Ear: External ear normal.      Left Ear: External ear normal.      Nose: Nose normal.   Eyes:      General: Lids are normal.      Pupils: Pupils are equal, round, and reactive to light.   Neck:      Trachea: No tracheal deviation.   Cardiovascular:      Rate and Rhythm: Normal rate and regular rhythm.      Pulses: No decreased pulses.      Heart sounds: Normal heart sounds. No murmur heard.     No friction rub. No gallop.   Pulmonary:      Effort: Pulmonary effort is normal. No respiratory distress.      Breath sounds: Normal breath sounds. No decreased breath sounds, wheezing, rhonchi or rales.   Abdominal:      General: Bowel sounds are normal.      Palpations: Abdomen is soft.      Tenderness: There is abdominal tenderness in the suprapubic area. There is no guarding or rebound.   Genitourinary:     Comments: Vaginal bleeding    Musculoskeletal:         General: No deformity. Normal range of motion.      Cervical back: Normal range of motion and neck supple.   Lymphadenopathy:      Cervical: No cervical adenopathy.   Skin:     General: Skin is warm and dry.      Findings: No rash.   Neurological:      Mental Status: She is alert and oriented to person, place, and time.      Cranial Nerves: No cranial nerve deficit.      Sensory: No sensory deficit.   Psychiatric:         Speech: Speech normal.         Behavior: Behavior normal.         Thought Content: Thought content normal.         Judgment: Judgment normal.         Procedures           ED Course                                               Medical Decision Making  Differential diagnosis includes retained products of conception, menstrual period, anemia, other unspecified etiology.    Labs show improving hemoglobin relative to previous comparison.  Normal PT and INR.  Normal kidney function electrolytes.  Urine shows blood but does not show  signs of infection.  Normal specific gravity.    Ultrasound and non-OB shows normal thickening of the endometrium and a small amount of clear fluid is noted within the endometrium which is nonspecific but may be concerning for retained products of conception.    The patient has remained stable throughout the ER course.  I discussed her with the on-call obstetrician, Dr. Rebollar.  He recommends outpatient follow-up.  I offered Provera to the patient the patient defers at this time and will simply follow-up.    She has remained stable with no significant vaginal bleeding throughout the ER course.    Problems Addressed:  Vaginal bleeding: complicated acute illness or injury with systemic symptoms    Amount and/or Complexity of Data Reviewed  Independent Historian: spouse  External Data Reviewed: labs and radiology.  Labs: ordered. Decision-making details documented in ED Course.  Radiology: ordered and independent interpretation performed. Decision-making details documented in ED Course.        Final diagnoses:   Vaginal bleeding       ED Disposition  ED Disposition       ED Disposition   Discharge    Condition   Stable    Comment   --               Good Ivan MD  [address] [id number]  [phone]    In 1 week      Sierra iNeves MD  [address] [facility] [address] [place], [place] [id number]  [phone]    Schedule an appointment as soon as possible for a visit       Ollie Cannon MD  [address] [facility] [address] [place] [id number]  [phone]    Schedule an appointment as soon as possible for a visit            Medication List      No changes were made to your prescriptions during this visit.            Cherry Alvarez MD  10/24/24 [id number]

## 2024-10-23 NOTE — Clinical Note
[facility] EMERGENCY DEPARTMENT  [address]  [facility] [address]  Phone: [phone]    Nataliia Lr was seen and treated in our emergency department on 10/23/2024.  She may return to work on 10/26/2024.         Thank you for choosing [facility].    Cherry Alvarez MD

## 2024-11-26 ENCOUNTER — LAB (OUTPATIENT)
Dept: LAB | Facility: HOSPITAL | Age: 24
End: 2024-11-26
Payer: COMMERCIAL

## 2024-11-26 ENCOUNTER — OFFICE VISIT (OUTPATIENT)
Dept: ONCOLOGY | Facility: CLINIC | Age: 24
End: 2024-11-26
Payer: COMMERCIAL

## 2024-11-26 VITALS
DIASTOLIC BLOOD PRESSURE: 75 MMHG | BODY MASS INDEX: 30.8 KG/M2 | TEMPERATURE: 97.3 F | HEIGHT: 64 IN | HEART RATE: 90 BPM | OXYGEN SATURATION: 98 % | SYSTOLIC BLOOD PRESSURE: 125 MMHG | WEIGHT: 180.4 LBS | RESPIRATION RATE: 16 BRPM

## 2024-11-26 DIAGNOSIS — Z86.718 HISTORY OF DVT (DEEP VEIN THROMBOSIS): ICD-10-CM

## 2024-11-26 DIAGNOSIS — Z86.718 HISTORY OF DVT (DEEP VEIN THROMBOSIS): Primary | ICD-10-CM

## 2024-11-26 LAB
BASOPHILS # BLD AUTO: 0.05 10*3/MM3 (ref 0–0.2)
BASOPHILS NFR BLD AUTO: 0.9 % (ref 0–1.5)
DEPRECATED RDW RBC AUTO: 43.3 FL (ref 37–54)
EOSINOPHIL # BLD AUTO: 0.28 10*3/MM3 (ref 0–0.4)
EOSINOPHIL NFR BLD AUTO: 4.9 % (ref 0.3–6.2)
ERYTHROCYTE [DISTWIDTH] IN BLOOD BY AUTOMATED COUNT: 14.4 % (ref 12.3–15.4)
FERRITIN SERPL-MCNC: 9.62 NG/ML (ref 13–150)
HCT VFR BLD AUTO: 35.8 % (ref 34–46.6)
HGB BLD-MCNC: 11.2 G/DL (ref 12–15.9)
IMM GRANULOCYTES # BLD AUTO: 0.01 10*3/MM3 (ref 0–0.05)
IMM GRANULOCYTES NFR BLD AUTO: 0.2 % (ref 0–0.5)
IRON 24H UR-MRATE: 38 MCG/DL (ref 37–145)
IRON SATN MFR SERPL: 9 % (ref 20–50)
LYMPHOCYTES # BLD AUTO: 1.87 10*3/MM3 (ref 0.7–3.1)
LYMPHOCYTES NFR BLD AUTO: 32.9 % (ref 19.6–45.3)
MCH RBC QN AUTO: 25.6 PG (ref 26.6–33)
MCHC RBC AUTO-ENTMCNC: 31.3 G/DL (ref 31.5–35.7)
MCV RBC AUTO: 81.9 FL (ref 79–97)
MONOCYTES # BLD AUTO: 0.57 10*3/MM3 (ref 0.1–0.9)
MONOCYTES NFR BLD AUTO: 10 % (ref 5–12)
NEUTROPHILS NFR BLD AUTO: 2.91 10*3/MM3 (ref 1.7–7)
NEUTROPHILS NFR BLD AUTO: 51.1 % (ref 42.7–76)
PLATELET # BLD AUTO: 233 10*3/MM3 (ref 140–450)
PMV BLD AUTO: 11.5 FL (ref 6–12)
RBC # BLD AUTO: 4.37 10*6/MM3 (ref 3.77–5.28)
TIBC SERPL-MCNC: 422 MCG/DL (ref 298–536)
TRANSFERRIN SERPL-MCNC: 283 MG/DL (ref 200–360)
WBC NRBC COR # BLD AUTO: 5.69 10*3/MM3 (ref 3.4–10.8)

## 2024-11-26 PROCEDURE — 84466 ASSAY OF TRANSFERRIN: CPT

## 2024-11-26 PROCEDURE — 85025 COMPLETE CBC W/AUTO DIFF WBC: CPT

## 2024-11-26 PROCEDURE — 36415 COLL VENOUS BLD VENIPUNCTURE: CPT

## 2024-11-26 PROCEDURE — 83540 ASSAY OF IRON: CPT

## 2024-11-26 PROCEDURE — 82728 ASSAY OF FERRITIN: CPT

## 2024-11-26 RX ORDER — FERROUS SULFATE 324(65)MG
324 TABLET, DELAYED RELEASE (ENTERIC COATED) ORAL DAILY
COMMUNITY
Start: 2024-10-29 | End: 2025-10-29

## 2024-11-26 NOTE — PROGRESS NOTES
DATE OF VISIT: 2024    REASON FOR VISIT: Followup for left lower extremity DVT     PROBLEM LIST:  1.  Left below-knee DVT:  A.  Presented with pain and edema left calf area  B.  Diagnosed after venous Doppler done July 3, 2023 confirming DVT in the posterior tibial vein  C.  Treated with Eliquis 5 mg twice daily for 3 months.  2.  Positive anticardiolipin antibody:  A.  Antiphospholipid antibody panel revealed intermediate anticardiolipin antibody level of 20.  B.  Repeated testing done 2024 revealed normal IgG and IgM anticardiolipin antibodies.    HISTORY OF PRESENT ILLNESS: The patient is a very pleasant 24 y.o. female  with past medical history significant for left lower extremity DVT diagnosed by . The  patient is here today for scheduled follow-up visit.    SUBJECTIVE: Nataliia is here today with her mother-in-law.  She is feeling better.  Her energy is gradually coming back.    Past History:  Medical History: has a past medical history of Abnormal Pap smear of cervix, Acute deep vein thrombosis (DVT) of calf muscle vein of left lower extremity (2023), Anticardiolipin antibody positive (2023), COVID-19, History of DVT of lower extremity (2023), History of dysmenorrhea, History of endometriosis, History of menorrhagia, and History of ovarian cyst.   Surgical History: has a past surgical history that includes Dudley tooth extraction; Ovarian cyst surgery (Right, 2023); and  section.   Family History: family history includes Breast cancer (age of onset: 47) in her mother.   Social History: reports that she has never smoked. She has never used smokeless tobacco. She reports that she does not drink alcohol and does not use drugs.    (Not in a hospital admission)     Allergies: Patient has no known allergies.     Review of Systems   Constitutional:  Positive for fatigue.   Hematological: Negative.    Psychiatric/Behavioral:  The patient is nervous/anxious.   "      PHYSICAL EXAMINATION:   /75   Pulse 90   Temp 97.3 °F (36.3 °C) (Temporal)   Resp 16   Ht 162.6 cm (64.02\")   Wt 81.8 kg (180 lb 6.4 oz)   SpO2 98%   BMI 30.95 kg/m²    Pain Score    11/26/24 0924   PainSc: 0-No pain                              ECOG Performance Status: 1 - Symptomatic but completely ambulatory      General Appearance:      alert, cooperative, no apparent distress and appears stated age   Lungs:   Clear to auscultation bilaterally; respirations regular, even, and unlabored bilaterally   Heart:  Regular rate and rhythm, no murmurs appreciated   Abdomen:   Soft, non-tender, non-distended, and no organomegaly                 Lab on 11/26/2024   Component Date Value Ref Range Status    WBC 11/26/2024 5.69  3.40 - 10.80 10*3/mm3 Final    RBC 11/26/2024 4.37  3.77 - 5.28 10*6/mm3 Final    Hemoglobin 11/26/2024 11.2 (L)  12.0 - 15.9 g/dL Final    Hematocrit 11/26/2024 35.8  34.0 - 46.6 % Final    MCV 11/26/2024 81.9  79.0 - 97.0 fL Final    MCH 11/26/2024 25.6 (L)  26.6 - 33.0 pg Final    MCHC 11/26/2024 31.3 (L)  31.5 - 35.7 g/dL Final    RDW 11/26/2024 14.4  12.3 - 15.4 % Final    RDW-SD 11/26/2024 43.3  37.0 - 54.0 fl Final    MPV 11/26/2024 11.5  6.0 - 12.0 fL Final    Platelets 11/26/2024 233  140 - 450 10*3/mm3 Final    Neutrophil % 11/26/2024 51.1  42.7 - 76.0 % Final    Lymphocyte % 11/26/2024 32.9  19.6 - 45.3 % Final    Monocyte % 11/26/2024 10.0  5.0 - 12.0 % Final    Eosinophil % 11/26/2024 4.9  0.3 - 6.2 % Final    Basophil % 11/26/2024 0.9  0.0 - 1.5 % Final    Immature Grans % 11/26/2024 0.2  0.0 - 0.5 % Final    Neutrophils, Absolute 11/26/2024 2.91  1.70 - 7.00 10*3/mm3 Final    Lymphocytes, Absolute 11/26/2024 1.87  0.70 - 3.10 10*3/mm3 Final    Monocytes, Absolute 11/26/2024 0.57  0.10 - 0.90 10*3/mm3 Final    Eosinophils, Absolute 11/26/2024 0.28  0.00 - 0.40 10*3/mm3 Final    Basophils, Absolute 11/26/2024 0.05  0.00 - 0.20 10*3/mm3 Final    Immature Grans, Absolute " 11/26/2024 0.01  0.00 - 0.05 10*3/mm3 Final        No results found.    ASSESSMENT: The patient is a very pleasant 24 y.o. female  with left lower extremity DVT      PLAN:    1.  History of left lower extremity below-knee DVT:  A.  She completed 3 months of Eliquis October 2023.  B.  Repeated venous U/S showed no evidence of residuary clot.   C.  No need for treatment at this point.    2.  Transient positive anticardiolipin antibody:  A.  Repeated testing showed negative IgM and IgG anticardiolipin antibodies on April 29, 2024.  B.  No need for repeat testing at this point.    3.  Mild leukocytosis neutrophilia:  A.  I discussed with the patient her blood results from October 28, 2024 revealed normal white cells at 6 with normal ANC.    4.  Normocytic anemia:  A.  I discussed with the patient her hemoglobin was low at 10.6 on October 28, 2024 with normal MCV 86.  She did have iron deficiency with ferritin of 13 and saturation of 9%.  B.  I will start the patient on oral iron replacement.  Will increase dietary iron.  C.  She will follow-up with me in 6 months with CBC and iron profile.    FOLLOW UP: 6 months with CBC and iron profile.    Deepa Merrill MD  11/26/2024

## 2024-12-09 ENCOUNTER — TELEPHONE (OUTPATIENT)
Dept: OBSTETRICS AND GYNECOLOGY | Facility: CLINIC | Age: 24
End: 2024-12-09

## 2024-12-09 NOTE — TELEPHONE ENCOUNTER
Caller: Be Nataliia YUDITH    Relationship: Self    Best call back number: [phone] EDVIN ANYTIME.    Who is your current provider: Mica Alcantar MD [facility]. LAST SAW  10/28/24 FOR POSTPARTUM AFTER MISCARRIAGE. LAST SAW  02/14/24.    Is your current provider offboarding? NA    Who would you like your new provider to be: PT IS REQUESTING TO SCHEDULE WITH      What are your reasons for transferring care: DISCUSS FUTURE PREGNANCIES, HISTORY OF MISCARRIAGE AND FAMILY PLANNING.    Additional notes: PAT[facility] . PATIENT WOULD LIKE TO SEE  INSTEAD OF . NOTES FROM  ARE IN CARE EVERYWHERE.

## 2024-12-12 ENCOUNTER — TELEPHONE (OUTPATIENT)
Dept: OBSTETRICS AND GYNECOLOGY | Facility: CLINIC | Age: 24
End: 2024-12-12
Payer: COMMERCIAL

## 2024-12-12 NOTE — TELEPHONE ENCOUNTER
Previous patient of Dr. Nieves; LOV was prenatal visit 24.   Patient was started on prophylactic Lovenox for hx DVT and intermediate level of Cardiolipin IgM antibodies.  Patient transferred her care to  and delivered there.   She had previously seen Dr. Carlos 23.  Returned patient's call.   States she had a terrible experience at . She delivered at 39 1/2 weeks and her baby unexpectedly  shortly after delivery due to severe chorioamnionitis with suspected abruption.   She does not want to return to  and is asking to see Dr. Norwood. She would like preconceptual counseling as she would like to try to conceive again soon.   Discussed with Dr. Norwood; she agreed to accept patient.   Informed patient. Appointment scheduled.

## 2025-01-21 ENCOUNTER — OFFICE VISIT (OUTPATIENT)
Dept: OBSTETRICS AND GYNECOLOGY | Facility: CLINIC | Age: 25
End: 2025-01-21
Payer: COMMERCIAL

## 2025-01-21 VITALS
BODY MASS INDEX: 32.27 KG/M2 | WEIGHT: 189 LBS | HEIGHT: 64 IN | DIASTOLIC BLOOD PRESSURE: 70 MMHG | SYSTOLIC BLOOD PRESSURE: 100 MMHG

## 2025-01-21 DIAGNOSIS — N89.8 VAGINAL DISCHARGE: ICD-10-CM

## 2025-01-21 DIAGNOSIS — Z86.718 HISTORY OF DVT (DEEP VEIN THROMBOSIS): ICD-10-CM

## 2025-01-21 DIAGNOSIS — Z31.69 PRE-CONCEPTION COUNSELING: Primary | ICD-10-CM

## 2025-01-21 RX ORDER — LORATADINE 10 MG/1
CAPSULE, LIQUID FILLED ORAL
COMMUNITY

## 2025-01-21 NOTE — PROGRESS NOTES
Chief Complaint   Patient presents with    Contraception       Subjective   HPI  Nataliia Lr is a 25 y.o. female, , LMP was on Patient's last menstrual period was 2025 (approximate). who presents for preconceptual counseling.    The patient is here to discuss conception after loss. She had a baby boy on 24 by  that  shortly after birth due to fetal infection. It was advised she start a prenatal vitamin.  Current Outpatient Medications on File Prior to Visit   Medication Sig Dispense Refill    cetirizine (zyrTEC) 10 MG tablet Take 1 tablet by mouth Daily. (Patient not taking: Reported on 2024)      Enoxaparin Sodium (LOVENOX) 40 MG/0.4ML solution prefilled syringe syringe Inject 0.4 mL under the skin into the appropriate area as directed Daily. (Patient not taking: Reported on 2024) 12 mL 5    ferrous sulfate 324 MG tablet delayed-release Take 1 tablet by mouth Daily.      Loratadine (Claritin) 10 MG capsule Take  by mouth.      ondansetron ODT (ZOFRAN-ODT) 8 MG disintegrating tablet Place 1 tablet on the tongue Every 8 (Eight) Hours As Needed for Nausea or Vomiting. (Patient not taking: Reported on 2024) 30 tablet 0    prenatal vitamin (prenatal, CLASSIC, vitamin) tablet Take  by mouth Daily. (Patient not taking: Reported on 2024)       No current facility-administered medications on file prior to visit.        Additional OB/GYN History   Last Pap : 23 negative  Last Completed Pap Smear            PAP SMEAR (Every 3 Years) Next due on 2026  LIQUID-BASED PAP SMEAR WITH HPV GENOTYPING IF ASCUS ([facility])                  History of abnormal Pap smear: no  Exercises Regularly: no  Feelings of Anxiety or Depression: no  Tobacco Usage?: No   OB History          1    Para   0    Term   0       0    AB   0    Living   0         SAB   0    IAB   0    Ectopic   0    Molar   0    Multiple   0    Live Births   0           Obstetric Comments   Fob #1 - Pregnancy #1                  Current Outpatient Medications:     cetirizine (zyrTEC) 10 MG tablet, Take 1 tablet by mouth Daily. (Patient not taking: Reported on 2024), Disp: , Rfl:     Enoxaparin Sodium (LOVENOX) 40 MG/0.4ML solution prefilled syringe syringe, Inject 0.4 mL under the skin into the appropriate area as directed Daily. (Patient not taking: Reported on 2024), Disp: 12 mL, Rfl: 5    ferrous sulfate 324 MG tablet delayed-release, Take 1 tablet by mouth Daily., Disp: , Rfl:     Loratadine (Claritin) 10 MG capsule, Take  by mouth., Disp: , Rfl:     ondansetron ODT (ZOFRAN-ODT) 8 MG disintegrating tablet, Place 1 tablet on the tongue Every 8 (Eight) Hours As Needed for Nausea or Vomiting. (Patient not taking: Reported on 2024), Disp: 30 tablet, Rfl: 0    prenatal vitamin (prenatal, CLASSIC, vitamin) tablet, Take  by mouth Daily. (Patient not taking: Reported on 2024), Disp: , Rfl:      Past Medical History:   Diagnosis Date    Abnormal Pap smear of cervix     Acute deep vein thrombosis (DVT) of calf muscle vein of left lower extremity 2023    Post-op after right cystectomy in   Follows with hematologist Dr. Merrill       Anticardiolipin antibody positive 2023    COVID-19     History of DVT of lower extremity 2023    post-operative- lower left calf - after ovarian cyst surgery    History of dysmenorrhea     History of endometriosis     History of menorrhagia     History of ovarian cyst     right endometriotic cyst        Past Surgical History:   Procedure Laterality Date     SECTION  2024     shortly after birth    OVARIAN CYST SURGERY Right 2023    Procedure: OVARIAN CYSTECTOMY LAPAROSCOPIC WITH [employer] ROBOT RIGHT, PLACEMENT OF ADHESION BARRIER;  Surgeon: Tejal Hector MD;  Location: [facility];  Service: Robotics - [employer];  Laterality: Right;    WISDOM TOOTH EXTRACTION         The additional  "following portions of the patient's history were reviewed and updated as appropriate: allergies, current medications, past family history, past medical history, past social history, past surgical history, and problem list.    Review of Systems   All other systems reviewed and are negative.    All other systems reviewed and are negative.     I have reviewed and agree with the HPI, ROS, and historical information as entered above. Vickie Norwood MD      Objective   /70   Ht 162.6 cm (64\")   Wt 85.7 kg (189 lb)   LMP 01/14/2025 (Approximate)   Breastfeeding No   BMI 32.44 kg/m²     Physical Exam  Exam conducted with a chaperone present.   Constitutional:       Appearance: She is well-developed.   HENT:      Head: Normocephalic.   Eyes:      Conjunctiva/sclera: Conjunctivae normal.   Pulmonary:      Effort: Pulmonary effort is normal.   Abdominal:      Hernia: There is no hernia in the left inguinal area or right inguinal area.   Genitourinary:     General: Normal vulva.      Labia:         Right: No rash.         Left: No rash.       Vagina: Normal.      Cervix: Normal. No discharge.   Psychiatric:         Behavior: Behavior normal.         Assessment & Plan     Encounter Diagnoses   Name Primary?    Pre-conception counseling Yes    Vaginal discharge     History of DVT (deep vein thrombosis)          Reviewed previous history  including prenatal course, labor and delivery and postoperative course.  She is still grieving and has many questions about how and why something like this could happen.   All questions answered to the best of my ability but I think just a very unfortunate event and difficult for anyone to explain as very little answers exist.  She would like to d/w PDC prior to next pregnancy to see if anything can be done this next pregnancy to mitigate risk.  She is planning on repeat c/s.  Vaginal d/c - swab sent.  H/O DVT - she has been told mixed things on whether she needs Lovenox during " pregnancy or not. Her DVT was postop with negative work up at the time.  However she also has h/o elevated cardiolipin antibody.  This will be repeated today.             Total time spent today with Nataliia  was 45 minutes (level 4).      Vickie Norwood MD  01/21/2025

## 2025-01-22 LAB
CARDIOLIPIN IGG SER IA-ACNC: <9 GPL U/ML (ref 0–14)
CARDIOLIPIN IGM SER IA-ACNC: 13 MPL U/ML (ref 0–12)
TSH SERPL DL<=0.005 MIU/L-ACNC: 1.6 UIU/ML (ref 0.27–4.2)

## 2025-01-23 LAB
C TRACH DNA SPEC QL NAA+PROBE: NEGATIVE
M GENITALIUM DNA SPEC QL NAA+PROBE: NEGATIVE
M HOMINIS DNA SPEC QL NAA+PROBE: NEGATIVE
N GONORRHOEA DNA VAG QL NAA+PROBE: NEGATIVE
UREAPLASMA DNA SPEC QL NAA+PROBE: NEGATIVE

## 2025-01-24 LAB
A VAGINAE DNA VAG QL NAA+PROBE: NORMAL SCORE
BVAB2 DNA VAG QL NAA+PROBE: NORMAL SCORE
C ALBICANS DNA VAG QL NAA+PROBE: NEGATIVE
C GLABRATA DNA VAG QL NAA+PROBE: NEGATIVE
C KRUSEI DNA VAG QL NAA+PROBE: NEGATIVE
C LUSITANIAE DNA VAG QL NAA+PROBE: NEGATIVE
CANDIDA DNA VAG QL NAA+PROBE: NEGATIVE
MEGA1 DNA VAG QL NAA+PROBE: NORMAL SCORE
T VAGINALIS DNA VAG QL NAA+PROBE: NEGATIVE

## 2025-02-19 ENCOUNTER — OFFICE VISIT (OUTPATIENT)
Dept: OBSTETRICS AND GYNECOLOGY | Facility: HOSPITAL | Age: 25
End: 2025-02-19
Payer: COMMERCIAL

## 2025-02-19 VITALS
WEIGHT: 181 LBS | HEIGHT: 65 IN | BODY MASS INDEX: 30.16 KG/M2 | DIASTOLIC BLOOD PRESSURE: 64 MMHG | SYSTOLIC BLOOD PRESSURE: 112 MMHG | HEART RATE: 87 BPM

## 2025-02-19 DIAGNOSIS — Z84.89 HISTORY OF POSTNEONATAL DEATH OF CHILD: ICD-10-CM

## 2025-02-19 DIAGNOSIS — Z86.718 HISTORY OF DVT (DEEP VEIN THROMBOSIS): Primary | ICD-10-CM

## 2025-02-19 NOTE — LETTER
2025     Vickie Norwood MD  [address]  [place] [place] [address]    Patient: Nataliia Lr   YOB: 2000   Date of Visit: 2025       Dear Vickie Norwood MD,    Thank you for referring Nataliia Lr to me for evaluation. Below is a copy of my consult note.    If you have questions, please do not hesitate to call me. I look forward to following Nataliia along with you.         Sincerely,        Noa Romero MD        CC: No Recipients        Maternal/Fetal Medicine New Patient Note     Name: Nataliia Lr    : 2000     MRN: [id number]     Referring Provider: Vickie Norwood MD    Chief Complaint  Prenatal counsiling    Subjective     History of Present Illness:  Nataliia Lr is a 25 y.o.  Unknown who presents today for preconception counseling in the setting of prior  demise   Patient delivered via PCS at 39 weeks after being in labor for 24+ hours.   Per patient she had presented with possible PROM in the days prior but had negative amnisure   CS was performed in the setting of NRFHT   Post delivery the infant was coded and did not survive   Normal microarray   Placenta path indicated thrombosis of the chorionic plate and evidence of necrotic chorioamnionitis   Delivery was 2024  Patient also with history of DVT. Took Lovenox in prior pregnancy     CRISTINE: Estimated Date of Delivery: None noted.     ROS:   As noted in HPI.     Past Medical History:   Diagnosis Date   • Abnormal Pap smear of cervix    • Acute deep vein thrombosis (DVT) of calf muscle vein of left lower extremity 2023    Post-op after right cystectomy in   Follows with hematologist Dr. Merrill      • Anticardiolipin antibody positive 2023   • COVID-19    • History of DVT of lower extremity 2023    post-operative- lower left calf - after ovarian cyst surgery   • History of dysmenorrhea    • History of endometriosis    • History of menorrhagia    • History of  "ovarian cyst     right endometriotic cyst      Past Surgical History:   Procedure Laterality Date   •  SECTION  2024     shortly after birth   • OVARIAN CYST SURGERY Right 2023    Procedure: OVARIAN CYSTECTOMY LAPAROSCOPIC WITH DAVINCI ROBOT RIGHT, PLACEMENT OF ADHESION BARRIER;  Surgeon: Tejal Hector MD;  Location: [facility];  Service: Robotics - DaVinci;  Laterality: Right;   • WISDOM TOOTH EXTRACTION        OB History          1    Para   1    Term   1       0    AB   0    Living   0         SAB   0    IAB   0    Ectopic   0    Molar   0    Multiple   0    Live Births   1          Obstetric Comments   Fob #1 - Pregnancy #1                Current Outpatient Medications:   •  prenatal vitamin (prenatal, CLASSIC, vitamin) tablet, Take  by mouth Daily., Disp: , Rfl:     Objective     Vital Signs  /64 (BP Location: Right arm, Patient Position: Sitting, Cuff Size: Adult)   Pulse 87   Ht 165.1 cm (65\")   Wt 82.1 kg (181 lb)   LMP 2025 (Exact Date)   Estimated body mass index is 30.12 kg/m² as calculated from the following:    Height as of this encounter: 165.1 cm (65\").    Weight as of this encounter: 82.1 kg (181 lb).        Assessment and Plan     Diagnoses and all orders for this visit:    1. History of DVT (deep vein thrombosis) (Primary)    2. History of postneonatal death of child  Assessment & Plan:  Review of available records and discussion with patient indicates possible prolonged PPROM, chorioamnionitis with associated abruption   I recommended to Ms Woodard that she start a prenatal vitamin 3 months prior to planned conception.   I recommend prophylactic lovenox from conception through 6 weeks post partum based on her history of DVT   We discussed co-management with MFM in any future pregnancy including first trimester scan, anatomic survey and serial growth        We also reviewed appropriate pregnancy spacing with goal of 18 months between delivery " and conception     Follow Up  No follow-ups on file.    I spent 45 minutes caring for the patient on the day of service. This included: obtaining or reviewing a separately obtained medical history, reviewing patient records, performing a medically appropriate exam and/or evaluation, counseling or educating the patient/family/caregiver, ordering medications, labs, and/or procedures and documenting such in the medical record. This does not include time spent on review and interpretation of other tests such as fetal ultrasound or the performance of other procedures such as amniocentesis or CVS.    Noa Romero MD FACOG  Maternal Fetal Medicine, [facility]     2025

## 2025-02-25 ENCOUNTER — TELEPHONE (OUTPATIENT)
Dept: OBSTETRICS AND GYNECOLOGY | Facility: CLINIC | Age: 25
End: 2025-02-25
Payer: COMMERCIAL

## 2025-02-25 PROBLEM — Z84.89: Status: ACTIVE | Noted: 2025-02-25

## 2025-02-25 NOTE — PROGRESS NOTES
Maternal/Fetal Medicine New Patient Note     Name: Nataliia Lr    : 2000     MRN: [id number]     Referring Provider: Vickie Norwood MD    Chief Complaint  Prenatal counsiling    Subjective     History of Present Illness:  Nataliia Lr is a 25 y.o.  Unknown who presents today for preconception counseling in the setting of prior  demise   Patient delivered via PCS at 39 weeks after being in labor for 24+ hours.   Per patient she had presented with possible PROM in the days prior but had negative amnisure   CS was performed in the setting of NRFHT   Post delivery the infant was coded and did not survive   Normal microarray   Placenta path indicated thrombosis of the chorionic plate and evidence of necrotic chorioamnionitis   Delivery was 2024  Patient also with history of DVT. Took Lovenox in prior pregnancy     CRISTINE: Estimated Date of Delivery: None noted.     ROS:   As noted in HPI.     Past Medical History:   Diagnosis Date    Abnormal Pap smear of cervix     Acute deep vein thrombosis (DVT) of calf muscle vein of left lower extremity 2023    Post-op after right cystectomy in   Follows with hematologist Dr. Merrill       Anticardiolipin antibody positive 2023    COVID-19     History of DVT of lower extremity 2023    post-operative- lower left calf - after ovarian cyst surgery    History of dysmenorrhea     History of endometriosis     History of menorrhagia     History of ovarian cyst     right endometriotic cyst      Past Surgical History:   Procedure Laterality Date     SECTION  2024     shortly after birth    OVARIAN CYST SURGERY Right 2023    Procedure: OVARIAN CYSTECTOMY LAPAROSCOPIC WITH DAVINCI ROBOT RIGHT, PLACEMENT OF ADHESION BARRIER;  Surgeon: Tejal Hector MD;  Location: [facility];  Service: Robotics - DaVinci;  Laterality: Right;    WISDOM TOOTH EXTRACTION        OB History          1    Para   1    Term   1     "   0    AB   0    Living   0         SAB   0    IAB   0    Ectopic   0    Molar   0    Multiple   0    Live Births   1          Obstetric Comments   Fob #1 - Pregnancy #1                Current Outpatient Medications:     prenatal vitamin (prenatal, CLASSIC, vitamin) tablet, Take  by mouth Daily., Disp: , Rfl:     Objective     Vital Signs  /64 (BP Location: Right arm, Patient Position: Sitting, Cuff Size: Adult)   Pulse 87   Ht 165.1 cm (65\")   Wt 82.1 kg (181 lb)   LMP 2025 (Exact Date)   Estimated body mass index is 30.12 kg/m² as calculated from the following:    Height as of this encounter: 165.1 cm (65\").    Weight as of this encounter: 82.1 kg (181 lb).        Assessment and Plan     Diagnoses and all orders for this visit:    1. History of DVT (deep vein thrombosis) (Primary)    2. History of postneonatal death of child  Assessment & Plan:  Review of available records and discussion with patient indicates possible prolonged PPROM, chorioamnionitis with associated abruption   I recommended to Ms Woodard that she start a prenatal vitamin 3 months prior to planned conception.   I recommend prophylactic lovenox from conception through 6 weeks post partum based on her history of DVT   We discussed co-management with MFM in any future pregnancy including first trimester scan, anatomic survey and serial growth        We also reviewed appropriate pregnancy spacing with goal of 18 months between delivery and conception     Follow Up  No follow-ups on file.    I spent 45 minutes caring for the patient on the day of service. This included: obtaining or reviewing a separately obtained medical history, reviewing patient records, performing a medically appropriate exam and/or evaluation, counseling or educating the patient/family/caregiver, ordering medications, labs, and/or procedures and documenting such in the medical record. This does not include time spent on review and interpretation of other " tests such as fetal ultrasound or the performance of other procedures such as amniocentesis or CVS.    Noa Romero MD FACOG  Maternal Fetal Medicine, [facility]     2025

## 2025-02-25 NOTE — ASSESSMENT & PLAN NOTE
Review of available records and discussion with patient indicates possible prolonged PPROM, chorioamnionitis with associated abruption   I recommended to Ms Yamilet that she start a prenatal vitamin 3 months prior to planned conception.   I recommend prophylactic lovenox from conception through 6 weeks post partum based on her history of DVT   We discussed co-management with MFM in any future pregnancy including first trimester scan, anatomic survey and serial growth

## 2025-02-25 NOTE — TELEPHONE ENCOUNTER
Pt is new ob, she has questions, she states she is leaking clear fluids... no other symptoms were mentioned on call. 3w5d

## 2025-02-25 NOTE — TELEPHONE ENCOUNTER
. LMP 2025. GA apprx 3w5 days. 1st +UPT yesterday. Denies vaginal bleeding and pain. Clear fluid/discharge. Changing panties once a day. Was having itching last week. She is currently out of town. Will not be back until tomorrow afternoon. Desires appt Thursday. Appt scheduled to evaluate discharge.

## 2025-02-27 ENCOUNTER — OFFICE VISIT (OUTPATIENT)
Dept: OBSTETRICS AND GYNECOLOGY | Facility: CLINIC | Age: 25
End: 2025-02-27
Payer: COMMERCIAL

## 2025-02-27 VITALS
SYSTOLIC BLOOD PRESSURE: 108 MMHG | WEIGHT: 186.6 LBS | BODY MASS INDEX: 31.09 KG/M2 | DIASTOLIC BLOOD PRESSURE: 64 MMHG | HEIGHT: 65 IN

## 2025-02-27 DIAGNOSIS — Z32.00 UNCONFIRMED PREGNANCY: ICD-10-CM

## 2025-02-27 DIAGNOSIS — N89.8 VAGINAL ITCHING: ICD-10-CM

## 2025-02-27 DIAGNOSIS — N89.8 VAGINAL DISCHARGE: Primary | ICD-10-CM

## 2025-02-27 NOTE — PROGRESS NOTES
Chief Complaint   Patient presents with    Vaginitis    + UPT at home          Subjective   HPI  Nataliia Lr is a 25 y.o. female, , who presents for evaluation of  vaginal discharge and itching  . The discharge is clear and white.  Her symptoms have been present on and off for 1 month(s).  Additional she has noticed  none . She was treated at [facility] for BV prior to her visit here with us on 25. At that visit on 25 she was tested for BV and yeast and was negative. She also reports a positive UPT at home on 25. She has not had an HCG drawn. Her LMP was 25. Her periods are irregular coming every 2-4 weeks at time.    Prior to the onset of symptoms she was not on antibiotics.  She has not recently changed soaps/detergents/toilet tissue.  Prior to this visit, she has used a pro and prebiotic in an attempt to improve her symptoms.     Sexual History    She is currently sexually active. In the past year there has been NO new sexual partners. Condoms are never used.  She would not like to be screened for STD's at today's exam.    Current birth control method:  none  .    Menstrual History:    Patient's last menstrual period was 2025 (exact date).    In the past 6 months her cycles have been irregular,at times occurring every 2-4 weeks.   Her menstrual flow is typically normal.       Additional OB/GYN History   Last Pap : 23  Last Completed Pap Smear            PAP SMEAR (Every 3 Years) Next due on 2026  LIQUID-BASED PAP SMEAR WITH HPV GENOTYPING IF ASCUS (YANDEL,COR,MAD)                    OB History          1    Para   1    Term   1       0    AB   0    Living   0         SAB   0    IAB   0    Ectopic   0    Molar   0    Multiple   0    Live Births   1          Obstetric Comments   Fob #1 - Pregnancy #1                The additional following portions of the patient's history were reviewed and updated as appropriate: allergies and  "current medications.    Review of Systems   Constitutional: Negative.    Respiratory: Negative.     Cardiovascular: Negative.    Gastrointestinal: Negative.    Genitourinary:  Positive for vaginal discharge (and itching).   Psychiatric/Behavioral: Negative.       All other systems reviewed and are negative.     I have reviewed and agree with the HPI, ROS, and historical information as entered above. Dionte Kent, APRN      Objective   /64   Ht 165.1 cm (65\")   Wt 84.6 kg (186 lb 9.6 oz)   LMP 01/30/2025 (Exact Date)   BMI 31.05 kg/m²     Physical Exam  Vitals and nursing note reviewed. Exam conducted with a chaperone present.   Constitutional:       Appearance: Normal appearance.   Genitourinary:     General: Normal vulva.      Exam position: Lithotomy position.      Labia:         Right: No rash, tenderness or lesion.         Left: No rash, tenderness or lesion.       Vagina: Normal. No lesions.      Cervix: Normal. No cervical motion tenderness, discharge, lesion or cervical bleeding.      Uterus: Normal. Not enlarged, not fixed and not tender.       Adnexa: Right adnexa normal and left adnexa normal.        Right: No mass or tenderness.          Left: No mass or tenderness.        Rectum: Normal. No external hemorrhoid.      Comments: Chaperone Present  Neurological:      Mental Status: She is alert.         Assessment & Plan     Assessment and Plan    Problem List Items Addressed This Visit    None  Visit Diagnoses       Vaginal discharge    -  Primary    Relevant Orders    NuSwab VG+, Candida 6sp    Vaginal itching        Relevant Orders    NuSwab VG+, Candida 6sp    Unconfirmed pregnancy        Relevant Orders    HCG, B-subunit, Quantitative              Nuswab ordered. Will treat if positive.   Counseling on vaginitis provided. May try OTC hydrocortisone cream while awaiting nuswab results.  Hcg today to confirm positive pregnancy test. Pt is on PNV and has a NOB appt  scheduled.  FU " 4/1/25 for STACY Kent, APRN  02/27/2025

## 2025-02-28 LAB — HCG INTACT+B SERPL-ACNC: 190 MIU/ML

## 2025-03-03 LAB
A VAGINAE DNA VAG QL NAA+PROBE: NORMAL SCORE
BVAB2 DNA VAG QL NAA+PROBE: NORMAL SCORE
C ALBICANS DNA VAG QL NAA+PROBE: NEGATIVE
C GLABRATA DNA VAG QL NAA+PROBE: NEGATIVE
C KRUSEI DNA VAG QL NAA+PROBE: NEGATIVE
C LUSITANIAE DNA VAG QL NAA+PROBE: NEGATIVE
C TRACH DNA SPEC QL NAA+PROBE: NEGATIVE
CANDIDA DNA VAG QL NAA+PROBE: NEGATIVE
MEGA1 DNA VAG QL NAA+PROBE: NORMAL SCORE
N GONORRHOEA DNA VAG QL NAA+PROBE: NEGATIVE
T VAGINALIS DNA VAG QL NAA+PROBE: NEGATIVE

## 2025-03-04 DIAGNOSIS — M79.605 PAIN OF LEFT LOWER EXTREMITY: Primary | ICD-10-CM

## 2025-03-04 DIAGNOSIS — Z3A.01 LESS THAN 8 WEEKS GESTATION OF PREGNANCY: ICD-10-CM

## 2025-03-04 RX ORDER — ENOXAPARIN SODIUM 100 MG/ML
40 INJECTION SUBCUTANEOUS
Qty: 12 ML | Refills: 8 | Status: SHIPPED | OUTPATIENT
Start: 2025-03-04

## 2025-03-04 NOTE — TELEPHONE ENCOUNTER
PT STATES SHE THINKS SHE MAY HAVE A BLOOD CLOT IN HER LEG AND WANTS US TO SEND A REFERRAL FOR HER TO HAVE AN ULTRASOUND DONE TO CHECK.     PLEASE ADVISE

## 2025-03-04 NOTE — TELEPHONE ENCOUNTER
LLE Doppler study was scheduled for 03/07/25. Spoke with Linda in outpatient cardiovascular lab; she does not have anything available today but moved patient's appointment to tomorrow morning at 8 am. Pt needs to be in Main Registration at 7:30 am.   Called patient and informed her; she v/u and agreed.

## 2025-03-05 ENCOUNTER — HOSPITAL ENCOUNTER (OUTPATIENT)
Dept: CARDIOLOGY | Facility: HOSPITAL | Age: 25
Discharge: HOME | End: 2025-03-05
Admitting: OBSTETRICS & GYNECOLOGY
Payer: COMMERCIAL

## 2025-03-05 VITALS — WEIGHT: 186.51 LBS | BODY MASS INDEX: 31.07 KG/M2 | HEIGHT: 65 IN

## 2025-03-05 LAB
BH CV LOWER VASCULAR LEFT COMMON FEMORAL AUGMENT: NORMAL
BH CV LOWER VASCULAR LEFT COMMON FEMORAL COMPRESS: NORMAL
BH CV LOWER VASCULAR LEFT COMMON FEMORAL PHASIC: NORMAL
BH CV LOWER VASCULAR LEFT COMMON FEMORAL SPONT: NORMAL
BH CV LOWER VASCULAR LEFT DISTAL FEMORAL AUGMENT: NORMAL
BH CV LOWER VASCULAR LEFT DISTAL FEMORAL COMPRESS: NORMAL
BH CV LOWER VASCULAR LEFT DISTAL FEMORAL PHASIC: NORMAL
BH CV LOWER VASCULAR LEFT DISTAL FEMORAL SPONT: NORMAL
BH CV LOWER VASCULAR LEFT GASTRONEMIUS COMPRESS: NORMAL
BH CV LOWER VASCULAR LEFT GREATER SAPH AK COMPRESS: NORMAL
BH CV LOWER VASCULAR LEFT GREATER SAPH BK COMPRESS: NORMAL
BH CV LOWER VASCULAR LEFT LESSER SAPH COMPRESS: NORMAL
BH CV LOWER VASCULAR LEFT MID FEMORAL AUGMENT: NORMAL
BH CV LOWER VASCULAR LEFT MID FEMORAL COMPRESS: NORMAL
BH CV LOWER VASCULAR LEFT MID FEMORAL PHASIC: NORMAL
BH CV LOWER VASCULAR LEFT MID FEMORAL SPONT: NORMAL
BH CV LOWER VASCULAR LEFT PERONEAL COMPRESS: NORMAL
BH CV LOWER VASCULAR LEFT POPLITEAL AUGMENT: NORMAL
BH CV LOWER VASCULAR LEFT POPLITEAL COMPRESS: NORMAL
BH CV LOWER VASCULAR LEFT POPLITEAL PHASIC: NORMAL
BH CV LOWER VASCULAR LEFT POPLITEAL SPONT: NORMAL
BH CV LOWER VASCULAR LEFT POSTERIOR TIBIAL COMPRESS: NORMAL
BH CV LOWER VASCULAR LEFT PROFUNDA FEMORAL PHASIC: NORMAL
BH CV LOWER VASCULAR LEFT PROFUNDA FEMORAL SPONT: NORMAL
BH CV LOWER VASCULAR LEFT PROXIMAL FEMORAL AUGMENT: NORMAL
BH CV LOWER VASCULAR LEFT PROXIMAL FEMORAL COMPRESS: NORMAL
BH CV LOWER VASCULAR LEFT PROXIMAL FEMORAL PHASIC: NORMAL
BH CV LOWER VASCULAR LEFT PROXIMAL FEMORAL SPONT: NORMAL
BH CV LOWER VASCULAR LEFT SAPHENOFEMORAL JUNCTION AUGMENT: NORMAL
BH CV LOWER VASCULAR LEFT SAPHENOFEMORAL JUNCTION COMPRESS: NORMAL
BH CV LOWER VASCULAR LEFT SAPHENOFEMORAL JUNCTION PHASIC: NORMAL
BH CV LOWER VASCULAR LEFT SAPHENOFEMORAL JUNCTION SPONT: NORMAL
BH CV LOWER VASCULAR RIGHT COMMON FEMORAL AUGMENT: NORMAL
BH CV LOWER VASCULAR RIGHT COMMON FEMORAL PHASIC: NORMAL
BH CV LOWER VASCULAR RIGHT COMMON FEMORAL SPONT: NORMAL

## 2025-03-05 PROCEDURE — 93971 EXTREMITY STUDY: CPT

## 2025-03-10 ENCOUNTER — TELEPHONE (OUTPATIENT)
Age: 25
End: 2025-03-10
Payer: COMMERCIAL

## 2025-03-10 NOTE — TELEPHONE ENCOUNTER
RN called MFM at [facility] requesting that Dr. Merrill wants to speak with Dr. Romero about patient's lovenox. She is out of office this morning but will be there after lunch. Provided office staff with Dr. Merrill cell # to call whenever she is back in office.

## 2025-03-31 PROBLEM — O09.299 PRIOR PREGNANCY WITH FETAL DEMISE: Status: ACTIVE | Noted: 2025-03-31

## 2025-03-31 PROBLEM — Z34.90 PRENATAL CARE, ANTEPARTUM: Status: RESOLVED | Noted: 2024-02-14 | Resolved: 2025-03-31

## 2025-04-01 ENCOUNTER — INITIAL PRENATAL (OUTPATIENT)
Dept: OBSTETRICS AND GYNECOLOGY | Facility: CLINIC | Age: 25
End: 2025-04-01
Payer: COMMERCIAL

## 2025-04-01 VITALS — BODY MASS INDEX: 31.28 KG/M2 | WEIGHT: 188 LBS | DIASTOLIC BLOOD PRESSURE: 70 MMHG | SYSTOLIC BLOOD PRESSURE: 102 MMHG

## 2025-04-01 DIAGNOSIS — Z84.89 HISTORY OF POSTNEONATAL DEATH OF CHILD: ICD-10-CM

## 2025-04-01 DIAGNOSIS — Z36.9 ENCOUNTER FOR ANTENATAL SCREENING: Primary | ICD-10-CM

## 2025-04-01 DIAGNOSIS — Q51.9 UTERINE ANOMALY: ICD-10-CM

## 2025-04-01 DIAGNOSIS — Z86.718 HISTORY OF DVT (DEEP VEIN THROMBOSIS): ICD-10-CM

## 2025-04-01 DIAGNOSIS — Z3A.08 8 WEEKS GESTATION OF PREGNANCY: ICD-10-CM

## 2025-04-01 DIAGNOSIS — O99.210 OBESITY IN PREGNANCY, ANTEPARTUM: ICD-10-CM

## 2025-04-01 DIAGNOSIS — Z87.42 HISTORY OF ABNORMAL CERVICAL PAP SMEAR: ICD-10-CM

## 2025-04-01 PROBLEM — O09.299 PRIOR PREGNANCY WITH FETAL DEMISE: Status: RESOLVED | Noted: 2025-03-31 | Resolved: 2025-04-01

## 2025-04-01 NOTE — PROGRESS NOTES
Initial ob visit     CC- Here for care of pregnancy        Nataliia Lr is a 25 y.o. female, , who presents for her first obstetrical visit. Patient's last menstrual period was 2025 (exact date). Her CRISTINE is 2025, by Last Menstrual Period. Current GA is 8w5d.     Initial positive test date : 2025, UPT        Her periods are every 28-30 days  Prior obstetric issues: Previous C/S for NRFHR. Baby passed after delivery. Placental pathology showed abruption, necrotizing chorioamnionitis, and large fetal vessel thrombosis of umbilical vein.  Patient's past medical history is significant for:  H/o DVT, Anticardiolipin indeterminate. Patient prescribed Lovenox by Hematology and was recommended to take from conception until 6 weeks PP by PDC. Patient does not feel like she needs it and is not taking .  Family history of genetic issues (includes FOB): No  Prior infections concerning in pregnancy (Rash, fever in last 2 weeks): No  Varicella Hx - unknown   Prior testing for Cystic Fibrosis Carrier or Sickle Cell Trait- No  Prepregnancy BMI - Body mass index is 31.28 kg/m².  History of STD: no  Hx of HSV for patient or partner: no  Ultrasound Today: Yes. SIUP measuring 9w0d based on CRL. FHR: 176bpm.     OB History    Para Term  AB Living   2 1 1 0 0 0   SAB IAB Ectopic Molar Multiple Live Births   0 0 0 0 0 1      # Outcome Date GA Lbr Lester/2nd Weight Sex Type Anes PTL Lv   2 Current            1 Term 24 39w5d  3290 g (7 lb 4.1 oz) M CS-LTranv EPI N ND      Complications: Fetal Intolerance, Abruptio Placenta      Obstetric Comments   Fob #1 - Pregnancy #1 and #2       Additional Pertinent History   Last Pap : 10/28/2024 Result: negative HPV:  negative. Performed at   Last Completed Pap Smear            Upcoming       PAP SMEAR (Every 3 Years) Next due on 2026  LIQUID-BASED PAP SMEAR WITH HPV GENOTYPING IF ASCUS (YANDEL,COR,MAD)                           History of abnormal Pap smear:  yes  Family history of uterine, colon, breast, or ovarian cancer: yes - breast CA in mother  Feelings of Anxiety or Depression: no  Tobacco Usage?: No   Alcohol/Drug Use?: NO  Over the age of 35 at delivery: no  Genetic Screening: Desires cell free DNA  Flu Status: Declines    PMH    Current Outpatient Medications:     prenatal vitamin (prenatal, CLASSIC, vitamin) tablet, Take  by mouth Daily., Disp: , Rfl:     enoxaparin sodium (LOVENOX) 40 MG/0.4ML solution prefilled syringe syringe, Inject 0.4 mL under the skin into the appropriate area as directed Daily. (Patient not taking: Reported on 2025), Disp: 12 mL, Rfl: 8     Past Medical History:   Diagnosis Date    Abnormal Pap smear of cervix     Acute deep vein thrombosis (DVT) of calf muscle vein of left lower extremity 2023    Post-op after right cystectomy in   Follows with hematologist Dr. Merrill       Anticardiolipin antibody positive 2023    COVID-19     History of DVT of lower extremity 2023    post-operative- lower left calf - after ovarian cyst surgery    History of dysmenorrhea     History of endometriosis     History of menorrhagia     History of ovarian cyst     right endometriotic cyst        Past Surgical History:   Procedure Laterality Date     SECTION  2024     shortly after birth    OVARIAN CYST SURGERY Right 2023    Procedure: OVARIAN CYSTECTOMY LAPAROSCOPIC WITH DAVINCI ROBOT RIGHT, PLACEMENT OF ADHESION BARRIER;  Surgeon: Tejal Hector MD;  Location: [facility];  Service: Robotics - DaVinci;  Laterality: Right;    WISDOM TOOTH EXTRACTION         Review of Systems   Review of Systems    Patient Reports:  No complaints  Patient Denies:excessive nausea , excessive vomiting, and vaginal bleeding  All systems reviewed and otherwise normal.    I have reviewed and agree with the HPI, ROS, and historical information as entered above. Vickie Norwood MD      /70    Wt 85.3 kg (188 lb)   LMP 01/30/2025 (Exact Date)   BMI 31.28 kg/m²     The additional following portions of the patient's history were reviewed and updated as appropriate: allergies, current medications, past family history, past medical history, past social history, past surgical history, and problem list.    Physical Exam  General:  well developed; well nourished  no acute distress  mentation appropriate   Chest/Respiratory: No labored breathing, normal respiratory effort, normal appearance, no respiratory noises noted   Heart:  normal rate, regular rhythm,  no murmurs, rubs, or gallops   Thyroid: normal to inspection and palpation   Breasts:  Not performed.   Abdomen: soft, non-tender; no masses  no umbilical or inguinal hernias are present  no hepato-splenomegaly   Pelvis:         Assessment and Plan    Problem List Items Addressed This Visit          Coag and Thromboembolic    History of DVT (deep vein thrombosis)    Overview   June 29, 2023 status post ovarian cystectomy with Dr. Hector.  5 days later developed a postoperative DVT.  Thrombophilia workup was negative.  Status post Eliquis for 3 months.  Follows with Dr. Merrill.   Recommend prophylactic Lovenox.  Discussed with Dr. Trejo.  Patient reluctant to take Lovenox and would like to stick with baby aspirin for now.  She will discuss with [facility] when she comes in for appointment.  She understands the risk of clot in pregnancy.  3/13/2024-[facility] recs We would recommend delivery of the patient at 38 weeks gestation through a heparin window. If the patient continues on Lovenox she would need to be off for 24 hours prior to delivery. If she is switched to unfractionated heparin she would need to be off for at least 8 hours after delivery. We would restart anticoagulation 8 hours after delivery and would continue it for 2 to 3 months postpartum   02/19/2025- [facility] recommended Lovenox injections from conception until 6 weeks PP            Genitourinary and  Reproductive     History of abnormal cervical Pap smear    Overview   2023 ASCUS HPV pool +  colposcopy done with Dr. Hector on 2023  Pap on 23 negative          Uterine anomaly    Overview   Bicornuate uterus            Gravid and     8 weeks gestation of pregnancy    Obesity in pregnancy, antepartum    Overview   [ ] ASA 81MG 12 weeks until delivery (if indicated)  [ ] HgB A1C at initial prenatal  [ ] 1 hour glucola at 12 weeks if HgB A1C elevated at initial prenatal          Relevant Orders    Hemoglobin A1c       Other    History of postneonatal death of child    Overview   P C/S at 39 weeks at  for persistent variables. Baby passed shortly after delivery. Baby's autopsy showed normal male karyotype. Placental pathology showed necrotizing chorioamnionitis, large fetal vessel thrombosis thrombosis extending to the umbilical vein and multiple chorionic vessels, and concern for possible abruption. Patient stopped Lovenox at 38 weeks.           Other Visit Diagnoses         Encounter for  screening    -  Primary    Relevant Orders    Obstetric Panel    HIV-1 / O / 2 Ag / Antibody    Urine Culture - Urine, Urine, Clean Catch    Urinalysis With Microscopic - Urine, Clean Catch    Hemoglobin A1c    Urine Drug Screen - Urine, Clean Catch    Varicella Zoster Antibody, IgG    Chlamydia trachomatis, Neisseria gonorrhoeae, PCR - Urine, Urine, Random Void            Pregnancy at 8w5d  Reviewed routine prenatal care with the office and educational materials given  Discussed options for genetic testing including first trimester nuchal translucency screen, genetic disease carrier testing, quadruple screen, and NIPT  Reviewed history and feel she should reconsider and take the Lovenox.  Discussed indications and concerns.  She will d/w [facility] again at her appt there.  Return in about 4 weeks (around 2025), or [facility] in 2 weeks requests Dr. Milligan.      Vickie Norwood MD  2025

## 2025-04-02 LAB
ABO GROUP BLD: ABNORMAL
AMPHETAMINES UR QL SCN: NEGATIVE NG/ML
APPEARANCE UR: CLEAR
BACTERIA #/AREA URNS HPF: NORMAL /HPF
BARBITURATES UR QL SCN: NEGATIVE NG/ML
BASOPHILS # BLD AUTO: 0 X10E3/UL (ref 0–0.2)
BASOPHILS NFR BLD AUTO: 0 %
BENZODIAZ UR QL SCN: NEGATIVE NG/ML
BILIRUB UR QL STRIP: NEGATIVE
BLD GP AB SCN SERPL QL: NEGATIVE
BZE UR QL SCN: NEGATIVE NG/ML
CANNABINOIDS UR QL SCN: NEGATIVE NG/ML
CASTS URNS MICRO: NORMAL
COLOR UR: YELLOW
CREAT UR-MCNC: 50.5 MG/DL (ref 20–300)
EOSINOPHIL # BLD AUTO: 0.1 X10E3/UL (ref 0–0.4)
EOSINOPHIL NFR BLD AUTO: 1 %
EPI CELLS #/AREA URNS HPF: NORMAL /HPF
ERYTHROCYTE [DISTWIDTH] IN BLOOD BY AUTOMATED COUNT: 16.1 % (ref 11.7–15.4)
GLUCOSE UR QL STRIP: NEGATIVE
HBA1C MFR BLD: 5.2 % (ref 4.8–5.6)
HBV SURFACE AG SERPL QL IA: NEGATIVE
HCT VFR BLD AUTO: 40.8 % (ref 34–46.6)
HCV IGG SERPL QL IA: NON REACTIVE
HGB BLD-MCNC: 13.5 G/DL (ref 11.1–15.9)
HGB UR QL STRIP: NEGATIVE
HIV 1+2 AB+HIV1 P24 AG SERPL QL IA: NON REACTIVE
IMM GRANULOCYTES # BLD AUTO: 0.1 X10E3/UL (ref 0–0.1)
IMM GRANULOCYTES NFR BLD AUTO: 1 %
KETONES UR QL STRIP: NEGATIVE
LABORATORY COMMENT REPORT: NORMAL
LEUKOCYTE ESTERASE UR QL STRIP: NEGATIVE
LYMPHOCYTES # BLD AUTO: 1.8 X10E3/UL (ref 0.7–3.1)
LYMPHOCYTES NFR BLD AUTO: 18 %
MCH RBC QN AUTO: 28.6 PG (ref 26.6–33)
MCHC RBC AUTO-ENTMCNC: 33.1 G/DL (ref 31.5–35.7)
MCV RBC AUTO: 86 FL (ref 79–97)
METHADONE UR QL SCN: NEGATIVE NG/ML
MONOCYTES # BLD AUTO: 0.6 X10E3/UL (ref 0.1–0.9)
MONOCYTES NFR BLD AUTO: 6 %
NEUTROPHILS # BLD AUTO: 7.1 X10E3/UL (ref 1.4–7)
NEUTROPHILS NFR BLD AUTO: 74 %
NITRITE UR QL STRIP: NEGATIVE
OPIATES UR QL SCN: NEGATIVE NG/ML
OXYCODONE+OXYMORPHONE UR QL SCN: NEGATIVE NG/ML
PCP UR QL: NEGATIVE NG/ML
PH UR STRIP: 6 [PH] (ref 5–8)
PH UR: 5.2 [PH] (ref 4.5–8.9)
PLATELET # BLD AUTO: 162 X10E3/UL (ref 150–450)
PROPOXYPH UR QL SCN: NEGATIVE NG/ML
PROT UR QL STRIP: NEGATIVE
RBC # BLD AUTO: 4.72 X10E6/UL (ref 3.77–5.28)
RBC #/AREA URNS HPF: NORMAL /HPF
RH BLD: POSITIVE
RPR SER QL: NON REACTIVE
RUBV IGG SERPL IA-ACNC: 1.09 INDEX
SP GR UR STRIP: 1.01 (ref 1–1.03)
UROBILINOGEN UR STRIP-MCNC: NORMAL MG/DL
VZV IGG SER QL IA: NON REACTIVE
WBC # BLD AUTO: 9.7 X10E3/UL (ref 3.4–10.8)
WBC #/AREA URNS HPF: NORMAL /HPF

## 2025-04-03 ENCOUNTER — TELEPHONE (OUTPATIENT)
Dept: OBSTETRICS AND GYNECOLOGY | Facility: CLINIC | Age: 25
End: 2025-04-03
Payer: COMMERCIAL

## 2025-04-03 ENCOUNTER — LAB (OUTPATIENT)
Dept: OBSTETRICS AND GYNECOLOGY | Facility: CLINIC | Age: 25
End: 2025-04-03
Payer: COMMERCIAL

## 2025-04-03 DIAGNOSIS — Z34.90 PRENATAL CARE, ANTEPARTUM, UNSPECIFIED GRAVIDITY: Primary | ICD-10-CM

## 2025-04-03 LAB
C TRACH RRNA SPEC QL NAA+PROBE: NEGATIVE
N GONORRHOEA RRNA SPEC QL NAA+PROBE: NEGATIVE

## 2025-04-03 NOTE — TELEPHONE ENCOUNTER
Patient of Dr. Norwood;  @ 9w 0d. LOV 25 for NOB visit.   Returned patient's call.   States she only called for lab hours. She has already been in today and had NIPS drawn.

## 2025-04-04 LAB
BACTERIA UR CULT: NO GROWTH
BACTERIA UR CULT: NORMAL

## 2025-04-08 LAB
5P15 DELETION (CRI-DU-CHAT): NOT DETECTED
CFDNA.FET/CFDNA.TOTAL SFR FETUS: NORMAL %
CITATION REF LAB TEST: NORMAL
FET 13+18+21+X+Y ANEUP PLAS.CFDNA: NEGATIVE
FET 1P36 DEL RISK WBC.DNA+CFDNA QL: NOT DETECTED
FET 22Q11.2 DEL RISK WBC.DNA+CFDNA QL: NOT DETECTED
FET CHR 11Q23 DEL PLAS.CFDNA QL: NOT DETECTED
FET CHR 15Q11 DEL PLAS.CFDNA QL: NOT DETECTED
FET CHR 21 TS PLAS.CFDNA QL: NEGATIVE
FET CHR 4P16 DEL PLAS.CFDNA QL: NOT DETECTED
FET CHR 8Q24 DEL PLAS.CFDNA QL: NOT DETECTED
FET MS X RISK WBC.DNA+CFDNA QL: NOT DETECTED
FET SEX PLAS.CFDNA DOSAGE CFDNA: NORMAL
FET TS 13 RISK PLAS.CFDNA QL: NEGATIVE
FET TS 18 RISK WBC.DNA+CFDNA QL: NEGATIVE
FET X + Y ANEUP RISK PLAS.CFDNA SEQ-IMP: NOT DETECTED
GA EST FROM CONCEPTION DATE: NORMAL D
GESTATIONAL AGE > 9:: YES
LAB DIRECTOR NAME PROVIDER: NORMAL
LAB DIRECTOR NAME PROVIDER: NORMAL
LABORATORY COMMENT REPORT: NORMAL
LIMITATIONS OF THE TEST: NORMAL
NEGATIVE PREDICTIVE VALUE: NORMAL
PERFORMANCE CHARACTERISTICS: NORMAL
POSITIVE PREDICTIVE VALUE: NORMAL
REF LAB TEST METHOD: NORMAL
SERVICE CMNT-IMP: NORMAL
TEST PERFORMANCE INFO SPEC: NORMAL
TRIOSOMY 16: NOT DETECTED
TRISOMY 22: NOT DETECTED

## 2025-04-11 ENCOUNTER — HOSPITAL ENCOUNTER (EMERGENCY)
Facility: HOSPITAL | Age: 25
Discharge: HOME | End: 2025-04-11
Attending: EMERGENCY MEDICINE
Payer: COMMERCIAL

## 2025-04-11 ENCOUNTER — APPOINTMENT (OUTPATIENT)
Dept: ULTRASOUND IMAGING | Facility: HOSPITAL | Age: 25
End: 2025-04-11
Payer: COMMERCIAL

## 2025-04-11 VITALS
BODY MASS INDEX: 30.73 KG/M2 | SYSTOLIC BLOOD PRESSURE: 98 MMHG | HEART RATE: 80 BPM | HEIGHT: 64 IN | TEMPERATURE: 98.2 F | WEIGHT: 180 LBS | OXYGEN SATURATION: 95 % | DIASTOLIC BLOOD PRESSURE: 52 MMHG | RESPIRATION RATE: 16 BRPM

## 2025-04-11 DIAGNOSIS — Z34.91 NORMAL IUP (INTRAUTERINE PREGNANCY) ON PRENATAL ULTRASOUND, FIRST TRIMESTER: Primary | ICD-10-CM

## 2025-04-11 DIAGNOSIS — O20.8 SUBCHORIONIC HEMORRHAGE OF PLACENTA IN FIRST TRIMESTER: ICD-10-CM

## 2025-04-11 DIAGNOSIS — O20.0 THREATENED MISCARRIAGE: ICD-10-CM

## 2025-04-11 LAB
ABO GROUP BLD: NORMAL
BASOPHILS # BLD AUTO: 0.03 10*3/MM3 (ref 0–0.2)
BASOPHILS NFR BLD AUTO: 0.3 % (ref 0–1.5)
DEPRECATED RDW RBC AUTO: 49.7 FL (ref 37–54)
EOSINOPHIL # BLD AUTO: 0.12 10*3/MM3 (ref 0–0.4)
EOSINOPHIL NFR BLD AUTO: 1.3 % (ref 0.3–6.2)
ERYTHROCYTE [DISTWIDTH] IN BLOOD BY AUTOMATED COUNT: 15.7 % (ref 12.3–15.4)
HCG INTACT+B SERPL-ACNC: NORMAL MIU/ML
HCT VFR BLD AUTO: 40.5 % (ref 34–46.6)
HGB BLD-MCNC: 13.2 G/DL (ref 12–15.9)
HOLD SPECIMEN: NORMAL
IMM GRANULOCYTES # BLD AUTO: 0.07 10*3/MM3 (ref 0–0.05)
IMM GRANULOCYTES NFR BLD AUTO: 0.7 % (ref 0–0.5)
LYMPHOCYTES # BLD AUTO: 1.59 10*3/MM3 (ref 0.7–3.1)
LYMPHOCYTES NFR BLD AUTO: 17 % (ref 19.6–45.3)
MCH RBC QN AUTO: 28.4 PG (ref 26.6–33)
MCHC RBC AUTO-ENTMCNC: 32.6 G/DL (ref 31.5–35.7)
MCV RBC AUTO: 87.3 FL (ref 79–97)
MONOCYTES # BLD AUTO: 0.52 10*3/MM3 (ref 0.1–0.9)
MONOCYTES NFR BLD AUTO: 5.6 % (ref 5–12)
NEUTROPHILS NFR BLD AUTO: 7.02 10*3/MM3 (ref 1.7–7)
NEUTROPHILS NFR BLD AUTO: 75.1 % (ref 42.7–76)
NRBC BLD AUTO-RTO: 0 /100 WBC (ref 0–0.2)
NUMBER OF DOSES: NORMAL
PLATELET # BLD AUTO: 156 10*3/MM3 (ref 140–450)
PMV BLD AUTO: 12 FL (ref 6–12)
RBC # BLD AUTO: 4.64 10*6/MM3 (ref 3.77–5.28)
RH BLD: POSITIVE
WBC NRBC COR # BLD AUTO: 9.35 10*3/MM3 (ref 3.4–10.8)
WHOLE BLOOD HOLD COAG: NORMAL
WHOLE BLOOD HOLD SPECIMEN: NORMAL

## 2025-04-11 PROCEDURE — 84702 CHORIONIC GONADOTROPIN TEST: CPT | Performed by: EMERGENCY MEDICINE

## 2025-04-11 PROCEDURE — 76817 TRANSVAGINAL US OBSTETRIC: CPT

## 2025-04-11 PROCEDURE — 85025 COMPLETE CBC W/AUTO DIFF WBC: CPT | Performed by: EMERGENCY MEDICINE

## 2025-04-11 PROCEDURE — 86901 BLOOD TYPING SEROLOGIC RH(D): CPT | Performed by: EMERGENCY MEDICINE

## 2025-04-11 PROCEDURE — 99284 EMERGENCY DEPT VISIT MOD MDM: CPT

## 2025-04-11 PROCEDURE — 86900 BLOOD TYPING SEROLOGIC ABO: CPT | Performed by: EMERGENCY MEDICINE

## 2025-04-11 RX ORDER — SODIUM CHLORIDE 0.9 % (FLUSH) 0.9 %
10 SYRINGE (ML) INJECTION AS NEEDED
Status: DISCONTINUED | OUTPATIENT
Start: 2025-04-11 | End: 2025-04-11 | Stop reason: HOSPADM

## 2025-04-11 NOTE — ED PROVIDER NOTES
Subjective   History of Present Illness  Mrs Lr presents to evaluate vaginal bleeding.  She is 10 weeks pregnant by ultrasound.  She has had some vomiting today which is new.  Denies abdominal pain.  Describes the bleeding as dark.  She has history of a prior pregnancy that she lost at 9 months during delivery.  She has history of anticardiolipin antibody syndrome.  Has been on Lovenox in the past and has had 1 DVT.  Currently not taking Lovenox.  A+ blood type.  Her obstetrician is Katie Tinoco.      Review of Systems    Past Medical History:   Diagnosis Date    Abnormal Pap smear of cervix     Acute deep vein thrombosis (DVT) of calf muscle vein of left lower extremity 2023    Post-op after right cystectomy in   Follows with hematologist Dr. Merrill       Anticardiolipin antibody positive 2023    COVID-19     History of DVT of lower extremity 2023    post-operative- lower left calf - after ovarian cyst surgery    History of dysmenorrhea     History of endometriosis     History of menorrhagia     History of ovarian cyst     right endometriotic cyst       No Known Allergies    Past Surgical History:   Procedure Laterality Date     SECTION  2024     shortly after birth    OVARIAN CYST SURGERY Right 2023    Procedure: OVARIAN CYSTECTOMY LAPAROSCOPIC WITH DAVINCI ROBOT RIGHT, PLACEMENT OF ADHESION BARRIER;  Surgeon: Tejal Hector MD;  Location: [facility];  Service: Robotics - DaVinci;  Laterality: Right;    WISDOM TOOTH EXTRACTION         Family History   Problem Relation Age of Onset    Breast cancer Mother 47    Ovarian cancer Neg Hx     Uterine cancer Neg Hx     Colon cancer Neg Hx        Social History     Socioeconomic History    Marital status:    Tobacco Use    Smoking status: Never    Smokeless tobacco: Never   Vaping Use    Vaping status: Never Used   Substance and Sexual Activity    Alcohol use: Never    Drug use: Never    Sexual activity: Yes     Partners:  Male     Birth control/protection: None           Objective   Physical Exam  Vitals and nursing note reviewed.   Constitutional:       General: She is not in acute distress.     Appearance: Normal appearance.   HENT:      Head: Normocephalic and atraumatic.      Nose: Nose normal. No congestion or rhinorrhea.   Eyes:      General: No scleral icterus.     Conjunctiva/sclera: Conjunctivae normal.   Neck:      Comments: No JVD   Cardiovascular:      Rate and Rhythm: Normal rate and regular rhythm.      Heart sounds: No murmur heard.     No friction rub.   Pulmonary:      Effort: Pulmonary effort is normal.      Breath sounds: Normal breath sounds. No wheezing or rales.   Abdominal:      General: Bowel sounds are normal.      Palpations: Abdomen is soft. There is mass.      Tenderness: There is no abdominal tenderness. There is no guarding or rebound.   Musculoskeletal:         General: No tenderness.      Cervical back: Normal range of motion and neck supple.      Right lower leg: No edema.      Left lower leg: No edema.   Skin:     General: Skin is warm and dry.      Coloration: Skin is not pale.      Findings: No erythema.   Neurological:      General: No focal deficit present.      Mental Status: She is alert and oriented to person, place, and time.      Motor: No weakness.      Coordination: Coordination normal.   Psychiatric:         Mood and Affect: Mood normal.         Behavior: Behavior normal.         Thought Content: Thought content normal.         Procedures           ED Course  ED Course as of 04/11/25 1451   Fri Apr 11, 2025   0904 Ultrasound shows healthy 10-week baby.  Small amount of subchorionic hemorrhage.  I explained that to Mrs. Lr and her .  Recommended bedrest for couple of days. [DT]      ED Course User Index  [DT] Kin Gregg MD                                                       Medical Decision Making  Reviewed and interpreted prior records regarding labs and prenatal care.   Ordered and interpreted multiple labs as well as ultrasound.  Had reevaluation and discussion    Problems Addressed:  Normal IUP (intrauterine pregnancy) on prenatal ultrasound, first trimester: complicated acute illness or injury  Subchorionic hemorrhage of placenta in first trimester: complicated acute illness or injury that poses a threat to life or bodily functions  Threatened miscarriage: complicated acute illness or injury that poses a threat to life or bodily functions    Amount and/or Complexity of Data Reviewed  External Data Reviewed: labs and notes.  Labs: ordered. Decision-making details documented in ED Course.  Radiology: ordered. Decision-making details documented in ED Course.        Final diagnoses:   Normal IUP (intrauterine pregnancy) on prenatal ultrasound, first trimester   Subchorionic hemorrhage of placenta in first trimester   Threatened miscarriage       ED Disposition  ED Disposition       ED Disposition   Discharge    Condition   Stable    Comment   --               Vickie Norwood MD  [address] [address]  [phone]               Medication List      No changes were made to your prescriptions during this visit.            Kin Gregg MD  04/11/25 9229

## 2025-04-11 NOTE — DISCHARGE INSTRUCTIONS
Observed bedrest for the next 3 days.  Return if abdominal pain, significantly worsening bleeding, or any other concerns.  Call Dr. Tinoco to arrange close follow-up.

## 2025-04-18 ENCOUNTER — OFFICE VISIT (OUTPATIENT)
Dept: OBSTETRICS AND GYNECOLOGY | Facility: HOSPITAL | Age: 25
End: 2025-04-18
Payer: COMMERCIAL

## 2025-04-18 ENCOUNTER — HOSPITAL ENCOUNTER (OUTPATIENT)
Dept: WOMENS IMAGING | Facility: HOSPITAL | Age: 25
Discharge: HOME | End: 2025-04-18
Payer: COMMERCIAL

## 2025-04-18 VITALS — SYSTOLIC BLOOD PRESSURE: 107 MMHG | WEIGHT: 189 LBS | DIASTOLIC BLOOD PRESSURE: 56 MMHG | BODY MASS INDEX: 32.44 KG/M2

## 2025-04-18 DIAGNOSIS — Z86.718 HISTORY OF DVT (DEEP VEIN THROMBOSIS): Primary | ICD-10-CM

## 2025-04-18 DIAGNOSIS — Z86.718 HISTORY OF DVT (DEEP VEIN THROMBOSIS): ICD-10-CM

## 2025-04-18 DIAGNOSIS — O46.8X2 SUBCHORIONIC HEMORRHAGE OF PLACENTA IN SECOND TRIMESTER: ICD-10-CM

## 2025-04-18 DIAGNOSIS — Z84.89 HISTORY OF POSTNEONATAL DEATH OF CHILD: ICD-10-CM

## 2025-04-18 DIAGNOSIS — Q51.9 UTERINE ANOMALY: ICD-10-CM

## 2025-04-18 PROCEDURE — 76813 OB US NUCHAL MEAS 1 GEST: CPT

## 2025-04-18 PROCEDURE — 76801 OB US < 14 WKS SINGLE FETUS: CPT

## 2025-04-18 NOTE — LETTER
2025     Vickie Norwood MD  [address] [facility] [address] [place] [id number]    Patient: Nataliia Lr   YOB: 2000   Date of Visit: 2025     Dear Vickie Norwood MD:       Thank you for referring Nataliia Lr to me for evaluation. Below are the relevant portions of my assessment and plan of care.    If you have questions, please do not hesitate to call me. I look forward to following Nataliia along with you.         Sincerely,        Paulina Trejo MD        CC: No Recipients    Paulina Trejo MD  25 1023  Sign when Signing Visit      Maternal/Fetal Medicine Consult Note     Name: Nataliia Lr    : 2000     MRN: [id number]     Referring Provider: Vickie Norwood MD    Chief Complaint  hx DVT; hx NND at 39 weeks; prev. c/s    Subjective     History of Present Illness:  Nataliia Lr is a 25 y.o.  11w1d who presents today for an NT assessment and discussion of anti-coagulation. Pt is s/p low risk NIPT.     Pt denies bleading/LOF/cramping.     CRISTINE: Estimated Date of Delivery: 25     ROS:   As noted in HPI.     Past Medical History:   Diagnosis Date   • Abnormal Pap smear of cervix    • Acute deep vein thrombosis (DVT) of calf muscle vein of left lower extremity 2023    Post-op after right cystectomy in   Follows with hematologist Dr. Merrill      • Anticardiolipin antibody positive 2023   • COVID-19    • History of DVT of lower extremity 2023    post-operative- lower left calf - after ovarian cyst surgery   • History of dysmenorrhea    • History of endometriosis    • History of menorrhagia    • History of ovarian cyst     right endometriotic cyst   • Placenta previa in second trimester 2025 confirmed SIUP, marginal previa, MARISELA 22.6mm x 14mm x 14.2mm   Pelvic rest  PDC referral        Past Surgical History:   Procedure Laterality Date   •  SECTION  2024     shortly after birth   • OVARIAN CYST  "SURGERY Right 2023    Procedure: OVARIAN CYSTECTOMY LAPAROSCOPIC WITH DAVINCI ROBOT RIGHT, PLACEMENT OF ADHESION BARRIER;  Surgeon: Tejal Hector MD;  Location: [facility];  Service: Robotics - DaVinci;  Laterality: Right;   • WISDOM TOOTH EXTRACTION        OB History          2    Para   1    Term   1       0    AB   0    Living   0         SAB   0    IAB   0    Ectopic   0    Molar   0    Multiple   0    Live Births   1          Obstetric Comments   Fob #1 - Pregnancy #1 and #2               Objective     Vital Signs  /56   Wt 85.7 kg (189 lb)   LMP 2025 (Exact Date)   Estimated body mass index is 32.44 kg/m² as calculated from the following:    Height as of 25: 162.6 cm (64\").    Weight as of this encounter: 85.7 kg (189 lb).    Physical Exam  Constitutional:       Appearance: Normal appearance. She is normal weight.   HENT:      Head: Normocephalic and atraumatic.   Cardiovascular:      Rate and Rhythm: Normal rate.   Pulmonary:      Effort: Pulmonary effort is normal.   Musculoskeletal:         General: Normal range of motion.      Cervical back: Normal range of motion and neck supple.   Neurological:      General: No focal deficit present.      Mental Status: She is alert and oriented to person, place, and time.   Psychiatric:         Mood and Affect: Mood normal.         Behavior: Behavior normal.         Thought Content: Thought content normal.         Judgment: Judgment normal.       Ultrasound Impression:   Viable SIUP, CRL=dates, nl MVP, posterior placenta, nl NT, nl     Assessment and Plan     Diagnoses and all orders for this visit:    1. History of DVT (deep vein thrombosis) (Primary)  Assessment & Plan:  Patient has a history of developing a DVT 5 days after an ovarian cystectomy in  for a right ovarian endometrioma. She had a thrombophilia work-up and there was an intermediate elevation of the IgM anti-cardiolipin antibodies x 1 that was negative on " "repeat. She was treated with Eliquis x 3mos and then follow-up ultrasound revealed that there was not evidence of residual clot.     During her last pregnancy in  she was treated with prophylactic Lovenox at 40mgs daily throughout the pregnancy and for 6wks postpartum.     This pregnancy was delivered at 39wks via emergent  after failed IOL with non-reassuring FHT. Baby  shortly after delivery. Autopsy was normal. Placental pathology revealed:   \"inflammation characteristic of amniotic fluid infection:  - maternal response stage 3 (advanced, necrotizing chorioamnionitis), grade 2 (severe)  - fetal response stage 2 (intermediate, umbilical vasculitis), grade 2 (severe)    findings of high-grade fetal vascular malperfusion:  - large fetal vessel thrombosis, including evolving thrombosis of the umbilical vein and multiple chorionic plate vessels (see comment)  - chorionic vascular ectasia    - acute abruption suspected clinically  - terminal villi otherwise appropriate for gestational age  - three-vessel umbilical cord with inflammation and evolving thrombosis, as described above\"    Patient is s/p a pre-conception consult where it was recommended to patient that she do prophylactic Lovenox in this pregnancy as well, through the pregnancy and for 6wks postpartum, due to her h/o DVT and then her  demise.     Patient does not want to do the prophylactic dose Lovenox this pregnancy. She feels that her DVT was provoked, her anti-cardiolipin testing was essentially negative, and that her poor pregnancy outcome was not secondary to an underlying clotting disorder, but was secondary to infection, non-reassuring fetal heart tracing, and prolonged induction.     I explained that in cases like hers, we tend to be very cautious and conservative and recommend the most conservative approach to treatment in hopes of having the best outcome. Patient understands this, but has very negative associations with " doing the Lovenox and doesn't think that it actually helped last pregnancy. She is willing to do a daily ASA 81mgs and to do the Lovenox for 6wks postpartum as she understands that this is the highest risk time for development of a DVT/PE.      - After long discussion, patient to continue daily ASA 81mgs throughout pregnancy   - Will do prophylactic Lovenox 40mgs daily for 6wks postpartum   - Follow-up scheduled in 4wks      2. History of postneonatal death of child  Assessment & Plan:  Likely secondary to prolonged IOL, prolonged ROM, chorioamnionitis, and possible abruption.     Patient was on prophylactic Lovenox throughout this pregnancy.        3. Subchorionic hemorrhage of placenta in second trimester    4. Uterine anomaly  Assessment & Plan:  Bicornuate uterus is again noted on today's exam,     Bicornuate uterus can be associated with an increased risk of PTL and malpresentation.            Follow Up  Return in about 4 weeks (around 5/16/2025).    I spent 45 minutes caring for the patient on the day of service. This included: obtaining or reviewing a separately obtained medical history, reviewing patient records, performing a medically appropriate exam and/or evaluation, counseling or educating the patient/family/caregiver, ordering medications, labs, and/or procedures and documenting such in the medical record. This does not include time spent on review and interpretation of other tests such as fetal ultrasound or the performance of other procedures such as amniocentesis or CVS.      Paulina Trejo MD  04/18/2025

## 2025-04-18 NOTE — PROGRESS NOTES
Patient denies any leaking fluid, vaginal bleeding, or contractions.  NIPT negative.  Patient reports next follow-up appointment with Dr. Norwood' office is 4/29.

## 2025-04-23 ENCOUNTER — ANCILLARY PROCEDURE (OUTPATIENT)
Dept: EMERGENCY DEPT | Facility: HOSPITAL | Age: 25
End: 2025-04-23
Payer: COMMERCIAL

## 2025-04-23 ENCOUNTER — HOSPITAL ENCOUNTER (EMERGENCY)
Facility: HOSPITAL | Age: 25
Discharge: HOME | End: 2025-04-24
Attending: EMERGENCY MEDICINE
Payer: COMMERCIAL

## 2025-04-23 DIAGNOSIS — O20.0 THREATENED MISCARRIAGE: Primary | ICD-10-CM

## 2025-04-23 DIAGNOSIS — O46.90 VAGINAL BLEEDING IN PREGNANCY: ICD-10-CM

## 2025-04-23 DIAGNOSIS — Z34.90 INTRAUTERINE PREGNANCY: ICD-10-CM

## 2025-04-23 PROCEDURE — 76815 OB US LIMITED FETUS(S): CPT | Performed by: EMERGENCY MEDICINE

## 2025-04-23 PROCEDURE — 85018 HEMOGLOBIN: CPT | Performed by: EMERGENCY MEDICINE

## 2025-04-23 PROCEDURE — 84702 CHORIONIC GONADOTROPIN TEST: CPT | Performed by: EMERGENCY MEDICINE

## 2025-04-23 PROCEDURE — 85014 HEMATOCRIT: CPT | Performed by: EMERGENCY MEDICINE

## 2025-04-23 PROCEDURE — 99284 EMERGENCY DEPT VISIT MOD MDM: CPT

## 2025-04-24 ENCOUNTER — ANCILLARY PROCEDURE (OUTPATIENT)
Dept: EMERGENCY DEPT | Facility: HOSPITAL | Age: 25
End: 2025-04-24
Payer: COMMERCIAL

## 2025-04-24 ENCOUNTER — ROUTINE PRENATAL (OUTPATIENT)
Dept: OBSTETRICS AND GYNECOLOGY | Facility: CLINIC | Age: 25
End: 2025-04-24
Payer: COMMERCIAL

## 2025-04-24 VITALS
SYSTOLIC BLOOD PRESSURE: 108 MMHG | HEART RATE: 85 BPM | TEMPERATURE: 97.7 F | WEIGHT: 180 LBS | DIASTOLIC BLOOD PRESSURE: 71 MMHG | HEIGHT: 64 IN | BODY MASS INDEX: 30.73 KG/M2 | RESPIRATION RATE: 16 BRPM | OXYGEN SATURATION: 94 %

## 2025-04-24 VITALS — DIASTOLIC BLOOD PRESSURE: 72 MMHG | BODY MASS INDEX: 31.76 KG/M2 | SYSTOLIC BLOOD PRESSURE: 108 MMHG | WEIGHT: 185 LBS

## 2025-04-24 DIAGNOSIS — Z86.718 HISTORY OF DVT (DEEP VEIN THROMBOSIS): ICD-10-CM

## 2025-04-24 DIAGNOSIS — O44.02 PLACENTA PREVIA IN SECOND TRIMESTER: ICD-10-CM

## 2025-04-24 DIAGNOSIS — O46.8X2 SUBCHORIONIC HEMORRHAGE OF PLACENTA IN SECOND TRIMESTER: ICD-10-CM

## 2025-04-24 DIAGNOSIS — R31.0 GROSS HEMATURIA: ICD-10-CM

## 2025-04-24 DIAGNOSIS — O20.9 VAGINAL BLEEDING IN PREGNANCY, FIRST TRIMESTER: Primary | ICD-10-CM

## 2025-04-24 DIAGNOSIS — Z84.89 HISTORY OF POSTNEONATAL DEATH OF CHILD: ICD-10-CM

## 2025-04-24 DIAGNOSIS — Q51.9 UTERINE ANOMALY: ICD-10-CM

## 2025-04-24 PROBLEM — Z3A.08 8 WEEKS GESTATION OF PREGNANCY: Status: RESOLVED | Noted: 2025-04-01 | Resolved: 2025-04-24

## 2025-04-24 LAB
BILIRUB BLD-MCNC: NEGATIVE MG/DL
CLARITY, POC: CLEAR
COLOR UR: YELLOW
GLUCOSE UR STRIP-MCNC: NEGATIVE MG/DL
GLUCOSE UR STRIP-MCNC: NEGATIVE MG/DL
HCG INTACT+B SERPL-ACNC: NORMAL MIU/ML
HCT VFR BLD AUTO: 37.4 % (ref 34–46.6)
HGB BLD-MCNC: 12.1 G/DL (ref 12–15.9)
KETONES UR QL: NEGATIVE
LEUKOCYTE EST, POC: NEGATIVE
NITRITE UR-MCNC: NEGATIVE MG/ML
PH UR: 6 [PH] (ref 5–8)
PROT UR STRIP-MCNC: NEGATIVE MG/DL
PROT UR STRIP-MCNC: NEGATIVE MG/DL
RBC # UR STRIP: ABNORMAL /UL
SP GR UR: 1.01 (ref 1–1.03)
UROBILINOGEN UR QL: NORMAL

## 2025-04-24 NOTE — DISCHARGE INSTRUCTIONS
Tonight you are diagnosed with a threatened miscarriage.  This is a diagnosis given to any woman who has vaginal bleeding in early pregnancy.  At this time I cannot determine whether or not you will go on to have a normal healthy pregnancy or whether he will have a miscarriage.  You can use Tylenol to help with any pain.  You should call your OB/GYN doctor in the morning to schedule follow-up appointment.  Return to the ER as needed for new or worsening symptoms

## 2025-04-24 NOTE — ED PROVIDER NOTES
[place]    EMERGENCY DEPARTMENT ENCOUNTER      Pt Name: Nataliia Lr  MRN: [id number]  YOB: 2000  Date of evaluation: 2025  Provider: Ran Tiwari MD    CHIEF COMPLAINT       Chief Complaint   Patient presents with    Vaginal Bleeding - Pregnant         HISTORY OF PRESENT ILLNESS   Nataliia Lr is a 25 y.o. female who presents to the emergency department for evaluation of painless vaginal bleeding in the setting of early pregnancy.  She had a similar problem about 2 weeks ago, was having some abdominal cramping as well at that time.  She was seen by in the ER, had a positive blood type, had an ultrasound checked which showed intrauterine pregnancy with subchorionic hemorrhage, had elevated beta hCG level.    REVIEW OF SYSTEMS     ROS:  A chief complaint appropriate review of systems was completed and is negative except as noted in the HPI.      PAST MEDICAL HISTORY     Past Medical History:   Diagnosis Date    Abnormal Pap smear of cervix     Acute deep vein thrombosis (DVT) of calf muscle vein of left lower extremity 2023    Post-op after right cystectomy in   Follows with hematologist Dr. Merrill       Anticardiolipin antibody positive 2023    COVID-19     History of DVT of lower extremity 2023    post-operative- lower left calf - after ovarian cyst surgery    History of dysmenorrhea     History of endometriosis     History of menorrhagia     History of ovarian cyst     right endometriotic cyst         SURGICAL HISTORY       Past Surgical History:   Procedure Laterality Date     SECTION  2024     shortly after birth    OVARIAN CYST SURGERY Right 2023    Procedure: OVARIAN CYSTECTOMY LAPAROSCOPIC WITH DAVINCI ROBOT RIGHT, PLACEMENT OF ADHESION BARRIER;  Surgeon: Tejal Hector MD;  Location: [facility];  Service: Robotics - DaVinci;  Laterality: Right;    WISDOM TOOTH EXTRACTION           CURRENT MEDICATIONS     No current facility-administered  medications for this encounter.    Current Outpatient Medications:     enoxaparin sodium (LOVENOX) 40 MG/0.4ML solution prefilled syringe syringe, Inject 0.4 mL under the skin into the appropriate area as directed Daily. (Patient not taking: Reported on 4/18/2025), Disp: 12 mL, Rfl: 8    prenatal vitamin (prenatal, CLASSIC, vitamin) tablet, Take  by mouth Daily., Disp: , Rfl:     ALLERGIES     Patient has no known allergies.    FAMILY HISTORY       Family History   Problem Relation Age of Onset    Breast cancer Mother 47    Ovarian cancer Neg Hx     Uterine cancer Neg Hx     Colon cancer Neg Hx           SOCIAL HISTORY       Social History     Socioeconomic History    Marital status:    Tobacco Use    Smoking status: Never    Smokeless tobacco: Never   Vaping Use    Vaping status: Never Used   Substance and Sexual Activity    Alcohol use: Never    Drug use: Never    Sexual activity: Yes     Partners: Male     Birth control/protection: None         PHYSICAL EXAM    (up to 7 for level 4, 8 or more for level 5)     Vitals:    04/24/25 0045 04/24/25 0100 04/24/25 0130 04/24/25 0200   BP:  114/66 117/58 108/71   Pulse: 83 81 83 85   Resp:       Temp:       TempSrc:       SpO2: 95% 96% 95% 94%   Weight:       Height:           Physical Exam       DIAGNOSTIC RESULTS     EKG: All EKGs are interpreted by the Emergency Department Physician who either signs or Co-signs this chart in the absence of a cardiologist.    No orders to display         RADIOLOGY:   [x] Radiologist's Report Reviewed:  US OB ABDOMINAL IN ED BY PROVIDER    (Results Pending)   US OB ABDOMINAL IN ED BY PROVIDER    (Results Pending)   US OB ABDOMINAL IN ED BY PROVIDER    (Results Pending)       I ordered and independently reviewed the above noted radiographic studies.        LABS:  I independently interpreted all laboratory studies conducted during this ED visit.  The results of these studies can be seen below and my independent interpretation in the  ED course      EMERGENCY DEPARTMENT COURSE and DIFFERENTIAL DIAGNOSIS/MDM:   Vitals:  AS OF 05:29 EDT    BP - 108/71  HR - 85  TEMP - 97.7 °F (36.5 °C) (Oral)  O2 SATS - 94%        Discussion below represents my analysis of pertinent findings related to patient's condition, differential diagnosis, treatment plan and final disposition.      Differential diagnosis:  The differential diagnosis associated with the patient's presentation includes: Threatened miscarriage or completed miscarriage      Independent interpretations (ECG/rhythm strip/X-ray/US/CT scan): See ED course      Additional sources:  Discussed/obtained information from independent historians:   [x] Spouse:   [] Parent:   [] Friend:   [] EMS:   [] Other:    External record review:  4/11/25 reviewed most recent transvaginal ultrasound demonstrating a live intrauterine pregnancy dated 10 weeks and 1 day, small subchorionic hemorrhage      Patient's care impacted by:   [] Diabetes   [] Hypertension   [] Coronary Artery Disease   [] Cancer   [x] Other: Pregnancy    Care significantly affected by Social Determinants of Health (housing and economic circumstances, unemployment)    [] Yes     [x] No   If yes, Patient's care significantly limited by  Social Determinants of Health including:    [] Inadequate housing    [] Low income    [] Alcoholism and drug addiction in family    [] Problems related to primary support group    [] Unemployment    [] Problems related to employment    [] Other Social Determinants of Health:     I considered prescription management with:    [] Pain medication:   [] Antiviral:   [] Antibiotic:   [] Other:    Additional orders considered but not ordered:  The following testing was considered but ultimately not selected: Transvaginal ultrasound    ED Course:    ED Course as of 04/24/25 0529 Wed Apr 23, 2025   8890 Patient is A positive on blood type seen on prior Rh screen during this pregnancy no indication for RhoGAM [KB]   Thu Apr  24, 2025   4241 Laboratory workup independently interpreted by myself demonstrates a normal hemoglobin.  Patient's beta hCG level is substantially lower than it was 2 weeks ago. [KB]      ED Course User Index  [KB] Ran Tiwari MD         Diagnostic laboratory studies were conducted and a bedside ultrasound was conducted.  There are technical difficulties limiting the ability for these images to be uploaded to the chart.  A single intrauterine pregnancy is identified.  The fetal heart rate is 150.  Fetal motion is present.    Patient's H&H is normal.  Her beta hCG is downtrending compared with prior studies.  While in the ER patient reports that her bleeding is getting heavier.  These findings are concerning for an impending miscarriage.  Patient was counseled on possible outcomes of her pregnancy including continuation to a normal pregnancy, miscarriage.  She will call her OB/GYN doctor in the morning.  She is appropriate for outpatient management at this time.    Prior to discharge from the emergency department I discussed with the patient and any family members present the current diagnosis, treatment plan, recommendations regarding follow-up with a primary care doctor or specialist, general emergency room return precautions as well as return precautions specific to their diagnosis and treatment.  The patient/family indicated understanding of these instructions.  Time was allotted to answer questions at that time and throughout the ED course.             PROCEDURES:  US OB ABDOMINAL IN ED BY PROVIDER    Date/Time: 4/23/2025 11:35 PM    Performed by: Ran Tiwari MD  Authorized by: Ran Tiwari MD    Procedure details:     Indications: pregnant with vaginal bleeding    US OB ABDOMINAL IN ED BY PROVIDER    Date/Time: 4/23/2025 11:40 PM    Performed by: Ran Tiwari MD  Authorized by: Ran Tiwari MD    US OB ABDOMINAL IN ED BY PROVIDER    Date/Time: 4/23/2025 11:41 PM    Performed by: Ran Tiwari  MD  Authorized by: Ran Tiwari MD    Procedure details:     Indications: pregnant with vaginal bleeding      Assess:  Fetal viability    Technique:  Transabdominal obstetric (HCG+) exam  Uterine findings:     Intrauterine pregnancy: identified      Fetal heart rate: identified    Comments:      Live intrauterine pregnancy with a fetal heart rate of 150 and positive fetal movement is identified.      CRITICAL CARE TIME        CONSULTS       FINAL IMPRESSION      1. Threatened miscarriage    2. Vaginal bleeding in pregnancy    3. Intrauterine pregnancy          DISPOSITION/PLAN     ED Disposition       ED Disposition   Discharge    Condition   Stable    Comment   --                 Comment: Please note this report has been produced using speech recognition software.      Ran Tiwari MD  Attending Emergency Physician    Recent Results (from the past 24 hours)   hCG, Quantitative, Pregnancy    Collection Time: 04/23/25 11:58 PM    Specimen: Blood   Result Value Ref Range    HCG Quantitative 18,351.00 mIU/mL   Hemoglobin & Hematocrit, Blood    Collection Time: 04/23/25 11:58 PM    Specimen: Blood   Result Value Ref Range    Hemoglobin 12.1 12.0 - 15.9 g/dL    Hematocrit 37.4 34.0 - 46.6 %     Note: In addition to lab results from this visit, the labs listed above may include labs taken at another facility or during a different encounter within the last 24 hours. Please correlate lab times with ED admission and discharge times for further clarification of the services performed during this visit.                 Ran Tiwari MD  04/24/25 6052

## 2025-04-24 NOTE — PROGRESS NOTES
CC- vaginal bleeding       Nataliia Lr is a 25 y.o.  12w0d patient being seen today for {bleeding. It started 1 day(s) ago and has decreased since then. The bleeding was bright red in color. It is currently light brown. She reports that she went to ED last evening with heavier bright red bleeding that did soak her undergarments.The patient's blood type is RH Positive. She denies recent intercourse. She has not had a recent cervical check. She does not have associated pain. .      Her prenatal care is complicated by (and status) :    Patient Active Problem List   Diagnosis    Right tubo-ovarian mass    Sciatica of left side    History of abnormal cervical Pap smear    History of DVT (deep vein thrombosis)    Uterine anomaly    History of postneonatal death of child    Obesity in pregnancy, antepartum    Placenta previa in second trimester    Subchorionic hemorrhage of placenta in second trimester         Ultrasound Today: Yes.  Findings showed SIUP MARISELA and marginal preiva.  I have personally evaluated the U/S and agree with the findings. NEDA Llanes    ROS -   Patient Reports :  see above  Patient Denies: Loss of Fluid, Vision Changes, Headaches, Nausea , Vomiting , Contractions, Epigastric pain, and skin itching  Fetal Movement :  absent  All other systems reviewed and are negative.       The additional following portions of the patient's history were reviewed and updated as appropriate: allergies, current medications, past medical history, past social history, past surgical history, and problem list.    I have reviewed and agree with the HPI, ROS, and historical information as entered above. NEDA Llanes      /72   Wt 83.9 kg (185 lb)   LMP 2025 (Exact Date)   BMI 31.76 kg/m²       EXAM:     Prenatal Vitals  BP: 108/72  Weight: 83.9 kg (185 lb)   Fetal Heart Rate: 151                Assessment and Plan    Problem List Items Addressed This Visit          Coag and  Thromboembolic    History of DVT (deep vein thrombosis)    Overview   2023 status post ovarian cystectomy with Dr. Hector.  5 days later developed a postoperative DVT.  Thrombophilia workup was negative.  Status post Eliquis for 3 months.  Follows with Dr. Merrill.   Recommend prophylactic Lovenox.  Discussed with Dr. Trejo.  Patient reluctant to take Lovenox and would like to stick with baby aspirin for now.  She will discuss with PDC when she comes in for appointment.  She understands the risk of clot in pregnancy.  3/13/2024-formerly [facility] recs We would recommend delivery of the patient at 38 weeks gestation through a heparin window. If the patient continues on Lovenox she would need to be off for 24 hours prior to delivery. If she is switched to unfractionated heparin she would need to be off for at least 8 hours after delivery. We would restart anticoagulation 8 hours after delivery and would continue it for 2 to 3 months postpartum   2025- PDC recommended Lovenox injections from conception until 6 weeks PP            Genitourinary and Reproductive     Uterine anomaly    Overview   Bicornuate uterus            Gravid and     Placenta previa in second trimester    Overview    confirmed SIUP, marginal previa, MARISELA 22.6mm x 14mm x 14.2mm   Pelvic rest  PDC referral         Subchorionic hemorrhage of placenta in second trimester    Overview    confirmed SIUP, marginal previa, MARISELA 22.6mm x 14mm x 14.2mm   Pelvic rest            Other    History of postneonatal death of child    Overview   P C/S at 39 weeks at  for persistent variables. Baby passed shortly after delivery. Baby's autopsy showed normal male karyotype. Placental pathology showed necrotizing chorioamnionitis, large fetal vessel thrombosis thrombosis extending to the umbilical vein and multiple chorionic vessels, and concern for possible abruption. Patient stopped Lovenox at 38 weeks.           Other Visit Diagnoses          Vaginal bleeding in pregnancy, first trimester    -  Primary    Relevant Orders    US Ob < 14 Weeks Single or First Gestation    POC Urinalysis Dipstick (Completed)    POC Urinalysis Dipstick (Completed)      Gross hematuria        Relevant Orders    Urine Culture - Urine, Urine, Clean Catch            Pregnancy at [id number] in for bleeding left ED this am  Fetal status reassuring. Ultrasound today confirmed SIUP, marginal previa, MARISELA 22.6mm x 14mm x 14.2mm   US done today. Viable IUP  Advised pelvic rest, avoid heavy lifting, bleeding precautions reviewed.  Labor precautions reviewed.  CCUA negative, will send for culture. Will notify patient if results are positive for abx.   Increase PO fluids  Return for regularly scheduled OB appointment.  RTC for worsening symptoms  We discussed reasons why patient does not want to take lovenox. Benefits including prevention of blood clots however patient does not think lovenox will benefit her and still refuses. She agreed to compression socks.     A portion of the care provided today was to address vaginal bleeding, which is outside the scope of routine prenatal care.       Carrol Joseph, APRN  04/24/2025

## 2025-04-29 ENCOUNTER — ROUTINE PRENATAL (OUTPATIENT)
Dept: OBSTETRICS AND GYNECOLOGY | Facility: CLINIC | Age: 25
End: 2025-04-29
Payer: COMMERCIAL

## 2025-04-29 VITALS — SYSTOLIC BLOOD PRESSURE: 104 MMHG | DIASTOLIC BLOOD PRESSURE: 70 MMHG | WEIGHT: 188 LBS | BODY MASS INDEX: 32.27 KG/M2

## 2025-04-29 DIAGNOSIS — O46.8X2 SUBCHORIONIC HEMORRHAGE OF PLACENTA IN SECOND TRIMESTER: ICD-10-CM

## 2025-04-29 DIAGNOSIS — O99.210 OBESITY IN PREGNANCY, ANTEPARTUM: ICD-10-CM

## 2025-04-29 DIAGNOSIS — Z86.718 HISTORY OF DVT (DEEP VEIN THROMBOSIS): ICD-10-CM

## 2025-04-29 DIAGNOSIS — Z34.81 PRENATAL CARE, SUBSEQUENT PREGNANCY IN FIRST TRIMESTER: Primary | ICD-10-CM

## 2025-04-29 PROBLEM — O44.02 PLACENTA PREVIA IN SECOND TRIMESTER: Status: RESOLVED | Noted: 2025-04-24 | Resolved: 2025-04-29

## 2025-04-29 LAB
BILIRUB BLD-MCNC: NEGATIVE MG/DL
CLARITY, POC: CLEAR
COLOR UR: YELLOW
GLUCOSE UR STRIP-MCNC: NEGATIVE MG/DL
KETONES UR QL: NEGATIVE
LEUKOCYTE EST, POC: NEGATIVE
NITRITE UR-MCNC: NEGATIVE MG/ML
PH UR: 6 [PH] (ref 5–8)
PROT UR STRIP-MCNC: NEGATIVE MG/DL
RBC # UR STRIP: NEGATIVE /UL
SP GR UR: 1.03 (ref 1–1.03)
UROBILINOGEN UR QL: NORMAL

## 2025-04-29 RX ORDER — ASPIRIN 81 MG/1
81 TABLET, CHEWABLE ORAL DAILY
COMMUNITY

## 2025-04-29 NOTE — ASSESSMENT & PLAN NOTE
"Patient has a history of developing a DVT 5 days after an ovarian cystectomy in  for a right ovarian endometrioma. She had a thrombophilia work-up and there was an intermediate elevation of the IgM anti-cardiolipin antibodies x 1 that was negative on repeat. She was treated with Eliquis x 3mos and then follow-up ultrasound revealed that there was not evidence of residual clot.     During her last pregnancy in  she was treated with prophylactic Lovenox at 40mgs daily throughout the pregnancy and for 6wks postpartum.     This pregnancy was delivered at 39wks via emergent  after failed IOL with non-reassuring FHT. Baby  shortly after delivery. Autopsy was normal. Placental pathology revealed:   \"inflammation characteristic of amniotic fluid infection:  - maternal response stage 3 (advanced, necrotizing chorioamnionitis), grade 2 (severe)  - fetal response stage 2 (intermediate, umbilical vasculitis), grade 2 (severe)    findings of high-grade fetal vascular malperfusion:  - large fetal vessel thrombosis, including evolving thrombosis of the umbilical vein and multiple chorionic plate vessels (see comment)  - chorionic vascular ectasia    - acute abruption suspected clinically  - terminal villi otherwise appropriate for gestational age  - three-vessel umbilical cord with inflammation and evolving thrombosis, as described above\"    Patient is s/p a pre-conception consult where it was recommended to patient that she do prophylactic Lovenox in this pregnancy as well, through the pregnancy and for 6wks postpartum, due to her h/o DVT and then her  demise.     Patient does not want to do the prophylactic dose Lovenox this pregnancy. She feels that her DVT was provoked, her anti-cardiolipin testing was essentially negative, and that her poor pregnancy outcome was not secondary to an underlying clotting disorder, but was secondary to infection, non-reassuring fetal heart tracing, and prolonged " induction.     I explained that in cases like hers, we tend to be very cautious and conservative and recommend the most conservative approach to treatment in hopes of having the best outcome. Patient understands this, but has very negative associations with doing the Lovenox and doesn't think that it actually helped last pregnancy. She is willing to do a daily ASA 81mgs and to do the Lovenox for 6wks postpartum as she understands that this is the highest risk time for development of a DVT/PE.      - After long discussion, patient to continue daily ASA 81mgs throughout pregnancy   - Will do prophylactic Lovenox 40mgs daily for 6wks postpartum   - Follow-up scheduled in 4wks

## 2025-04-29 NOTE — ASSESSMENT & PLAN NOTE
Bicornuate uterus is again noted on today's exam,     Bicornuate uterus can be associated with an increased risk of PTL and malpresentation.

## 2025-04-29 NOTE — PROGRESS NOTES
OB FOLLOW UP  CC- Here for care of pregnancy        Nataliia Lr is a 25 y.o.  12w5d patient being seen today for her obstetrical follow up visit. Patient reports light spotting on . Patient is A+.    Her prenatal care is complicated by (and status) :   Patient Active Problem List   Diagnosis    Right tubo-ovarian mass    Sciatica of left side    History of abnormal cervical Pap smear    History of DVT (deep vein thrombosis)    Uterine anomaly    History of postneonatal death of child    Obesity in pregnancy, antepartum    Subchorionic hemorrhage of placenta in second trimester       Genetic testing?: already completed and was normal.  NOB labs reviewed  Ultrasound Today: No    ROS -   Patient Denies: leaking of fluid, vaginal bleeding, dysuria, excessive vomiting, and more than 6 contractions per hour  Fetal Movement: absent  Other than what is documented in the HPI, all other systems reviewed and are negative.     The additional following portions of the patient's history were reviewed and updated as appropriate: allergies and current medications.    I have reviewed and agree with the HPI, ROS, and historical information as entered above. Vickie Norwood MD          /70   Wt 85.3 kg (188 lb)   LMP 2025 (Exact Date)   BMI 32.27 kg/m²         EXAM:     Prenatal Vitals  BP: 104/70  Weight: 85.3 kg (188 lb)   Fetal Heart Rate: pos          Urine Glucose Read-only: Negative  Urine Protein Read-only: Negative       Assessment and Plan    Problem List Items Addressed This Visit          Coag and Thromboembolic    History of DVT (deep vein thrombosis)    Overview   2023 status post ovarian cystectomy with Dr. Hector.  5 days later developed a postoperative DVT.  Thrombophilia workup was negative.  Status post Eliquis for 3 months.  Follows with Dr. Merrill.   Recommend prophylactic Lovenox.  Discussed with Dr. Trejo.  Patient reluctant to take Lovenox and would like to  stick with baby aspirin for now.  She will discuss with PDC when she comes in for appointment.  She understands the risk of clot in pregnancy.  3/13/2024-PDC recs We would recommend delivery of the patient at 38 weeks gestation through a heparin window. If the patient continues on Lovenox she would need to be off for 24 hours prior to delivery. If she is switched to unfractionated heparin she would need to be off for at least 8 hours after delivery. We would restart anticoagulation 8 hours after delivery and would continue it for 2 to 3 months postpartum   2025- PDC recommended Lovenox injections from conception until 6 weeks PP            Gravid and     Obesity in pregnancy, antepartum    Overview   [ ] ASA 81MG 12 weeks until delivery (if indicated)  [ ] HgB A1C at initial prenatal  [ ] 1 hour glucola at 12 weeks if HgB A1C elevated at initial prenatal          Subchorionic hemorrhage of placenta in second trimester    Overview    confirmed SIUP, marginal previa, MARISELA 22.6mm x 14mm x 14.2mm   Pelvic rest          Other Visit Diagnoses         Prenatal care, subsequent pregnancy in first trimester    -  Primary    Relevant Orders    POC Urinalysis Dipstick (Completed)            Pregnancy at 12w5d  MARISELA- spotting x 1 since last visit. PDC scan later this week.  Labs reviewed from New OB Visit.  Counseled on genetic testing, carrier status and option for NT screen  Activity and Exercise discussed.  Modified while MARISELA is issue.  She does have scuds at home  Patient is on Prenatal vitamins  H/O DVT - currently declining Lovenox.    Continue baby ASA BID.  Return in about 16 days (around 5/15/2025).    Vickie Norwood MD  2025

## 2025-04-29 NOTE — ASSESSMENT & PLAN NOTE
Likely secondary to prolonged IOL, prolonged ROM, chorioamnionitis, and possible abruption.     Patient was on prophylactic Lovenox throughout this pregnancy.

## 2025-04-29 NOTE — PROGRESS NOTES
Maternal/Fetal Medicine Consult Note     Name: Nataliia Lr    : 2000     MRN: [id number]     Referring Provider: Vickie Norwood MD    Chief Complaint  hx DVT; hx NND at 39 weeks; prev. c/s    Subjective     History of Present Illness:  Nataliia Lr is a 25 y.o.  11w1d who presents today for an NT assessment and discussion of anti-coagulation. Pt is s/p low risk NIPT.     Pt denies bleading/LOF/cramping.     CRISTINE: Estimated Date of Delivery: 25     ROS:   As noted in HPI.     Past Medical History:   Diagnosis Date    Abnormal Pap smear of cervix     Acute deep vein thrombosis (DVT) of calf muscle vein of left lower extremity 2023    Post-op after right cystectomy in   Follows with hematologist Dr. Merrill       Anticardiolipin antibody positive 2023    COVID-19     History of DVT of lower extremity 2023    post-operative- lower left calf - after ovarian cyst surgery    History of dysmenorrhea     History of endometriosis     History of menorrhagia     History of ovarian cyst     right endometriotic cyst    Placenta previa in second trimester 2025 confirmed SIUP, marginal previa, MARISELA 22.6mm x 14mm x 14.2mm   Pelvic rest  PDC referral        Past Surgical History:   Procedure Laterality Date     SECTION  2024     shortly after birth    OVARIAN CYST SURGERY Right 2023    Procedure: OVARIAN CYSTECTOMY LAPAROSCOPIC WITH DAVINCI ROBOT RIGHT, PLACEMENT OF ADHESION BARRIER;  Surgeon: Tejal Hector MD;  Location: [facility];  Service: Robotics - DaVinci;  Laterality: Right;    WISDOM TOOTH EXTRACTION        OB History          2    Para   1    Term   1       0    AB   0    Living   0         SAB   0    IAB   0    Ectopic   0    Molar   0    Multiple   0    Live Births   1          Obstetric Comments   Fob #1 - Pregnancy #1 and #2               Objective     Vital Signs  /56   Wt 85.7 kg (189 lb)   LMP  "2025 (Exact Date)   Estimated body mass index is 32.44 kg/m² as calculated from the following:    Height as of 25: 162.6 cm (64\").    Weight as of this encounter: 85.7 kg (189 lb).    Physical Exam  Constitutional:       Appearance: Normal appearance. She is normal weight.   HENT:      Head: Normocephalic and atraumatic.   Cardiovascular:      Rate and Rhythm: Normal rate.   Pulmonary:      Effort: Pulmonary effort is normal.   Musculoskeletal:         General: Normal range of motion.      Cervical back: Normal range of motion and neck supple.   Neurological:      General: No focal deficit present.      Mental Status: She is alert and oriented to person, place, and time.   Psychiatric:         Mood and Affect: Mood normal.         Behavior: Behavior normal.         Thought Content: Thought content normal.         Judgment: Judgment normal.       Ultrasound Impression:   Viable SIUP, CRL=dates, nl MVP, posterior placenta, nl NT, nl     Assessment and Plan     Diagnoses and all orders for this visit:    1. History of DVT (deep vein thrombosis) (Primary)  Assessment & Plan:  Patient has a history of developing a DVT 5 days after an ovarian cystectomy in  for a right ovarian endometrioma. She had a thrombophilia work-up and there was an intermediate elevation of the IgM anti-cardiolipin antibodies x 1 that was negative on repeat. She was treated with Eliquis x 3mos and then follow-up ultrasound revealed that there was not evidence of residual clot.     During her last pregnancy in  she was treated with prophylactic Lovenox at 40mgs daily throughout the pregnancy and for 6wks postpartum.     This pregnancy was delivered at 39wks via emergent  after failed IOL with non-reassuring FHT. Baby  shortly after delivery. Autopsy was normal. Placental pathology revealed:   \"inflammation characteristic of amniotic fluid infection:  - maternal response stage 3 (advanced, necrotizing chorioamnionitis), " "grade 2 (severe)  - fetal response stage 2 (intermediate, umbilical vasculitis), grade 2 (severe)    findings of high-grade fetal vascular malperfusion:  - large fetal vessel thrombosis, including evolving thrombosis of the umbilical vein and multiple chorionic plate vessels (see comment)  - chorionic vascular ectasia    - acute abruption suspected clinically  - terminal villi otherwise appropriate for gestational age  - three-vessel umbilical cord with inflammation and evolving thrombosis, as described above\"    Patient is s/p a pre-conception consult where it was recommended to patient that she do prophylactic Lovenox in this pregnancy as well, through the pregnancy and for 6wks postpartum, due to her h/o DVT and then her  demise.     Patient does not want to do the prophylactic dose Lovenox this pregnancy. She feels that her DVT was provoked, her anti-cardiolipin testing was essentially negative, and that her poor pregnancy outcome was not secondary to an underlying clotting disorder, but was secondary to infection, non-reassuring fetal heart tracing, and prolonged induction.     I explained that in cases like hers, we tend to be very cautious and conservative and recommend the most conservative approach to treatment in hopes of having the best outcome. Patient understands this, but has very negative associations with doing the Lovenox and doesn't think that it actually helped last pregnancy. She is willing to do a daily ASA 81mgs and to do the Lovenox for 6wks postpartum as she understands that this is the highest risk time for development of a DVT/PE.      - After long discussion, patient to continue daily ASA 81mgs throughout pregnancy   - Will do prophylactic Lovenox 40mgs daily for 6wks postpartum   - Follow-up scheduled in 4wks      2. History of postneonatal death of child  Assessment & Plan:  Likely secondary to prolonged IOL, prolonged ROM, chorioamnionitis, and possible abruption.     Patient " was on prophylactic Lovenox throughout this pregnancy.        3. Subchorionic hemorrhage of placenta in second trimester    4. Uterine anomaly  Assessment & Plan:  Bicornuate uterus is again noted on today's exam,     Bicornuate uterus can be associated with an increased risk of PTL and malpresentation.            Follow Up  Return in about 4 weeks (around 5/16/2025).    I spent 45 minutes caring for the patient on the day of service. This included: obtaining or reviewing a separately obtained medical history, reviewing patient records, performing a medically appropriate exam and/or evaluation, counseling or educating the patient/family/caregiver, ordering medications, labs, and/or procedures and documenting such in the medical record. This does not include time spent on review and interpretation of other tests such as fetal ultrasound or the performance of other procedures such as amniocentesis or CVS.      Paulina Trejo MD  04/18/2025

## 2025-05-15 ENCOUNTER — HOSPITAL ENCOUNTER (OUTPATIENT)
Dept: WOMENS IMAGING | Facility: HOSPITAL | Age: 25
Discharge: HOME | End: 2025-05-15
Admitting: OBSTETRICS & GYNECOLOGY
Payer: COMMERCIAL

## 2025-05-15 ENCOUNTER — OFFICE VISIT (OUTPATIENT)
Dept: OBSTETRICS AND GYNECOLOGY | Facility: HOSPITAL | Age: 25
End: 2025-05-15
Payer: COMMERCIAL

## 2025-05-15 ENCOUNTER — ROUTINE PRENATAL (OUTPATIENT)
Dept: OBSTETRICS AND GYNECOLOGY | Facility: CLINIC | Age: 25
End: 2025-05-15
Payer: COMMERCIAL

## 2025-05-15 VITALS — BODY MASS INDEX: 32.82 KG/M2 | SYSTOLIC BLOOD PRESSURE: 102 MMHG | WEIGHT: 191.2 LBS | DIASTOLIC BLOOD PRESSURE: 52 MMHG

## 2025-05-15 VITALS — DIASTOLIC BLOOD PRESSURE: 80 MMHG | BODY MASS INDEX: 33.09 KG/M2 | SYSTOLIC BLOOD PRESSURE: 118 MMHG | WEIGHT: 192.8 LBS

## 2025-05-15 DIAGNOSIS — Z84.89 HISTORY OF POSTNEONATAL DEATH OF CHILD: ICD-10-CM

## 2025-05-15 DIAGNOSIS — O99.210 OBESITY IN PREGNANCY, ANTEPARTUM: ICD-10-CM

## 2025-05-15 DIAGNOSIS — O46.8X2 SUBCHORIONIC HEMORRHAGE OF PLACENTA IN SECOND TRIMESTER: Primary | ICD-10-CM

## 2025-05-15 DIAGNOSIS — O46.8X2 SUBCHORIONIC HEMORRHAGE OF PLACENTA IN SECOND TRIMESTER: ICD-10-CM

## 2025-05-15 DIAGNOSIS — Z86.718 HISTORY OF DVT (DEEP VEIN THROMBOSIS): ICD-10-CM

## 2025-05-15 DIAGNOSIS — Q51.9 UTERINE ANOMALY: ICD-10-CM

## 2025-05-15 DIAGNOSIS — Z34.92 PRENATAL CARE IN SECOND TRIMESTER, UNSPECIFIED GRAVIDITY: Primary | ICD-10-CM

## 2025-05-15 LAB
GLUCOSE UR STRIP-MCNC: NEGATIVE MG/DL
PROT UR STRIP-MCNC: NEGATIVE MG/DL

## 2025-05-15 PROCEDURE — 76815 OB US LIMITED FETUS(S): CPT

## 2025-05-15 NOTE — PROGRESS NOTES
Patient denies any leaking of fluid or contractions. She reports bright red vaginal bleeding on 4/23 that lasted for a few days.  NIPT negative.  Patient reports next follow-up appointment with Dr. Norwood' office is today.

## 2025-05-15 NOTE — PROGRESS NOTES
OB FOLLOW UP  CC- Here for care of pregnancy        Nataliia Lr is a 25 y.o.  15w0d patient being seen today for her obstetrical follow up visit. Patient reports no complaints    Her prenatal care is complicated by (and status) : see below.  Patient Active Problem List   Diagnosis    Right tubo-ovarian mass    Sciatica of left side    History of abnormal cervical Pap smear    History of DVT (deep vein thrombosis)    Uterine anomaly    History of postneonatal death of child    Obesity in pregnancy, antepartum    Subchorionic hemorrhage of placenta in second trimester     Ultrasound Today: No    AFP: declines    ROS -   Patient Denies: leaking of fluid, vaginal bleeding, dysuria, excessive vomiting, and more than 6 contractions per hour  Fetal Movement: absent  Other than what is documented in the HPI, all other systems reviewed and are negative.       The additional following portions of the patient's history were reviewed and updated as appropriate: allergies, current medications, past family history, past medical history, past social history, past surgical history, and problem list.      I have reviewed and agree with the HPI, ROS, and historical information as entered above. Vickie Norwood MD          EXAM:     Prenatal Vitals  BP: 118/80  Weight: 87.5 kg (192 lb 12.8 oz)             Urine Glucose Read-only: Negative  Urine Protein Read-only: Negative           Assessment and Plan    Problem List Items Addressed This Visit          Gravid and     Obesity in pregnancy, antepartum    Overview   [ ] ASA 81MG 12 weeks until delivery (if indicated)  [ ] HgB A1C at initial prenatal  [ ] 1 hour glucola at 12 weeks if HgB A1C elevated at initial prenatal          Subchorionic hemorrhage of placenta in second trimester    Overview    confirmed SIUP, marginal previa, MARISELA 22.6mm x 14mm x 14.2mm   Pelvic rest          Other Visit Diagnoses         Prenatal care in second trimester,  unspecified     -  Primary    Relevant Orders    POC Urinalysis Dipstick (Completed)            Pregnancy at 15w0d  Fetal status reassuring.   Counseled on MSAFP alone in relation to OTD and placental issues.    Anatomy scan next visit.   Activity and Exercise discussed.  Patient is on Prenatal vitamins  Return in about 4 weeks (around 2025).    Vickie Norwood MD  05/15/2025

## 2025-05-22 ENCOUNTER — REFERRAL TRIAGE (OUTPATIENT)
Dept: LABOR AND DELIVERY | Facility: HOSPITAL | Age: 25
End: 2025-05-22
Payer: COMMERCIAL

## 2025-05-29 ENCOUNTER — PATIENT OUTREACH (OUTPATIENT)
Dept: LABOR AND DELIVERY | Facility: HOSPITAL | Age: 25
End: 2025-05-29
Payer: COMMERCIAL

## 2025-05-29 NOTE — OUTREACH NOTE
[facility]  Enrollment    Current Estimated Gestational Age: 17w0d    Questions/Answers      Flowsheet Row Responses   Would like to participate? No          Spoke with  Nataliia YUDITH Lr regarding the [facility] Program. She has declined to enroll at this time.  Encouraged to call or message [facility] with any questions or concerns throughout this pregnancy. VU.    Basic pregnancy resources and information sent via her [employer] account.     Reba Gibbons RN  Maternity Nurse Navigator    5/29/2025, 16:59 EDT

## 2025-06-02 ENCOUNTER — TELEPHONE (OUTPATIENT)
Dept: OBSTETRICS AND GYNECOLOGY | Facility: CLINIC | Age: 25
End: 2025-06-02
Payer: COMMERCIAL

## 2025-06-02 NOTE — TELEPHONE ENCOUNTER
Caller: Nataliia Lr    Relationship to patient: Self    Best call back number: [phone]    Patient is needing: PATIENT CALLED AND STATED THAT SHE WAS TOLD TO CALL AND LET A NURSE KNOW WHENEVER SHE HAS BLEEDING DURING HER PREGNANCY     PATIENT STATED THAT SHE DID HAVE SOME LIGHT BLEEDING (JUST ON HER UNDERWEAR) ON SATURDAY BUT IT HAS STOPPED

## 2025-06-02 NOTE — TELEPHONE ENCOUNTER
Patient of Dr. Norwood;  @ 17w 4d with MARISELA. Hx of PCS 24 @ 39w 5d with placental abruption and  demise. LOV 05/15/25; NOV 25.   Returned patient's call.   States she had a couple of nickel to quarter sized spots of red and brown blood on her underwear on 25; none since.  Reports having some round ligament pain but denies any cramping or other problems.   States she did have intercourse 2-3 days prior to the spotting.   MBT is A positive.   Discussed previous instructions for pelvic rest, no strenuous activity, and no heavy lifting. Encouraged compliance and to discuss with Dr. Norwood at upcoming prenatal visit. Advised to call for any red or pink bleeding, cramping, or abdominal pain.   Patient v/u and agreed.

## 2025-06-04 ENCOUNTER — ROUTINE PRENATAL (OUTPATIENT)
Dept: OBSTETRICS AND GYNECOLOGY | Facility: CLINIC | Age: 25
End: 2025-06-04
Payer: COMMERCIAL

## 2025-06-04 VITALS — BODY MASS INDEX: 33.27 KG/M2 | WEIGHT: 193.8 LBS | SYSTOLIC BLOOD PRESSURE: 102 MMHG | DIASTOLIC BLOOD PRESSURE: 62 MMHG

## 2025-06-04 DIAGNOSIS — Z84.89 HISTORY OF POSTNEONATAL DEATH OF CHILD: ICD-10-CM

## 2025-06-04 DIAGNOSIS — Q51.9 UTERINE ANOMALY: ICD-10-CM

## 2025-06-04 DIAGNOSIS — O99.210 OBESITY IN PREGNANCY, ANTEPARTUM: ICD-10-CM

## 2025-06-04 DIAGNOSIS — Z86.718 HISTORY OF DVT (DEEP VEIN THROMBOSIS): ICD-10-CM

## 2025-06-04 DIAGNOSIS — Z34.92 PRENATAL CARE IN SECOND TRIMESTER, UNSPECIFIED GRAVIDITY: Primary | ICD-10-CM

## 2025-06-04 LAB
GLUCOSE UR STRIP-MCNC: NEGATIVE MG/DL
PROT UR STRIP-MCNC: NEGATIVE MG/DL

## 2025-06-04 NOTE — PROGRESS NOTES
OB FOLLOW UP  CC- Here for care of pregnancy        Nataliia Lr is a 25 y.o.  17w6d patient being seen today for her obstetrical follow up visit. Patient called the office on 2025 with possible vaginal spotting on her underwear. Patient states that she does not think she had vaginal bleeding and that her underwear were stained.     Patient canceled Hematology appointment on 2025. Patient does not plan to reschedule.     Her prenatal care is complicated by (and status) : see below.  Patient Active Problem List   Diagnosis    Right tubo-ovarian mass    Sciatica of left side    History of abnormal cervical Pap smear    History of DVT (deep vein thrombosis)    Uterine anomaly    History of postneonatal death of child    Obesity in pregnancy, antepartum    Subchorionic hemorrhage of placenta in second trimester       Ultrasound Today: No. Patient scheduled to see PDC on 2025.    AFP: declines    ROS -   Patient Denies: leaking of fluid, vaginal bleeding, dysuria, excessive vomiting, and more than 6 contractions per hour  Other than what is documented in the HPI, all other systems reviewed and are negative.       The additional following portions of the patient's history were reviewed and updated as appropriate: allergies, current medications, and problem list.      I have reviewed and agree with the HPI, ROS, and historical information as entered above. Vickie Norwood MD          EXAM:     Prenatal Vitals  BP: 102/62  Weight: 87.9 kg (193 lb 12.8 oz)   Fetal Heart Rate: pos         Urine Glucose Read-only: Negative  Urine Protein Read-only: Negative           Assessment and Plan    Problem List Items Addressed This Visit          Coag and Thromboembolic    History of DVT (deep vein thrombosis)    Overview   2023 status post ovarian cystectomy with Dr. Hector.  5 days later developed a postoperative DVT.  Thrombophilia workup was negative.  Status post Eliquis for 3 months.   Follows with Dr. Merrill.   Recommend prophylactic Lovenox.  Discussed with Dr. Trejo.  Patient reluctant to take Lovenox and would like to stick with baby aspirin for now.  She will discuss with PDC when she comes in for appointment.  She understands the risk of clot in pregnancy.  3/13/2024-[facility] recs We would recommend delivery of the patient at 38 weeks gestation through a heparin window. If the patient continues on Lovenox she would need to be off for 24 hours prior to delivery. If she is switched to unfractionated heparin she would need to be off for at least 8 hours after delivery. We would restart anticoagulation 8 hours after delivery and would continue it for 2 to 3 months postpartum   2025- PDC recommended Lovenox injections from conception until 6 weeks PP         Relevant Orders    POC Urinalysis Dipstick (Completed)       Genitourinary and Reproductive     Uterine anomaly    Overview   Bicornuate uterus            Gravid and     Obesity in pregnancy, antepartum    Overview   [ ] ASA 81MG 12 weeks until delivery (if indicated)  [ ] HgB A1C at initial prenatal  [ ] 1 hour glucola at 12 weeks if HgB A1C elevated at initial prenatal          Relevant Orders    POC Urinalysis Dipstick (Completed)       Other    History of postneonatal death of child    Overview   P C/S at 39 weeks at  for persistent variables. Baby passed shortly after delivery. Baby's autopsy showed normal male karyotype. Placental pathology showed necrotizing chorioamnionitis, large fetal vessel thrombosis thrombosis extending to the umbilical vein and multiple chorionic vessels, and concern for possible abruption. Patient stopped Lovenox at 38 weeks.          Relevant Orders    POC Urinalysis Dipstick (Completed)     Other Visit Diagnoses         Prenatal care in second trimester, unspecified     -  Primary    Relevant Orders    POC Urinalysis Dipstick (Completed)            Pregnancy at 17w6d  Fetal status  reassuring.   Counseled on MSAFP alone in relation to OTD and placental issues.    Anatomy scan next visit.   Activity and Exercise discussed.  Patient is on Prenatal vitamins  Return in about 15 days (around 6/19/2025) for PDC same day.    Vickie Norwood MD  06/04/2025

## 2025-06-19 ENCOUNTER — OFFICE VISIT (OUTPATIENT)
Dept: OBSTETRICS AND GYNECOLOGY | Facility: HOSPITAL | Age: 25
End: 2025-06-19
Payer: COMMERCIAL

## 2025-06-19 ENCOUNTER — HOSPITAL ENCOUNTER (OUTPATIENT)
Dept: WOMENS IMAGING | Facility: HOSPITAL | Age: 25
Discharge: HOME | End: 2025-06-19
Admitting: OBSTETRICS & GYNECOLOGY
Payer: COMMERCIAL

## 2025-06-19 ENCOUNTER — ROUTINE PRENATAL (OUTPATIENT)
Dept: OBSTETRICS AND GYNECOLOGY | Facility: CLINIC | Age: 25
End: 2025-06-19
Payer: COMMERCIAL

## 2025-06-19 VITALS — SYSTOLIC BLOOD PRESSURE: 110 MMHG | BODY MASS INDEX: 33.81 KG/M2 | DIASTOLIC BLOOD PRESSURE: 62 MMHG | WEIGHT: 197 LBS

## 2025-06-19 VITALS — BODY MASS INDEX: 33.81 KG/M2 | SYSTOLIC BLOOD PRESSURE: 101 MMHG | WEIGHT: 197 LBS | DIASTOLIC BLOOD PRESSURE: 58 MMHG

## 2025-06-19 DIAGNOSIS — Z86.718 HISTORY OF DVT (DEEP VEIN THROMBOSIS): ICD-10-CM

## 2025-06-19 DIAGNOSIS — Q51.9 UTERINE ANOMALY: ICD-10-CM

## 2025-06-19 DIAGNOSIS — Z84.89 HISTORY OF POSTNEONATAL DEATH OF CHILD: Primary | ICD-10-CM

## 2025-06-19 DIAGNOSIS — O99.210 OBESITY IN PREGNANCY, ANTEPARTUM: Primary | ICD-10-CM

## 2025-06-19 DIAGNOSIS — Z84.89 HISTORY OF POSTNEONATAL DEATH OF CHILD: ICD-10-CM

## 2025-06-19 DIAGNOSIS — O46.8X2 SUBCHORIONIC HEMORRHAGE OF PLACENTA IN SECOND TRIMESTER: ICD-10-CM

## 2025-06-19 LAB
GLUCOSE UR STRIP-MCNC: NEGATIVE MG/DL
PROT UR STRIP-MCNC: NEGATIVE MG/DL

## 2025-06-19 PROCEDURE — 76811 OB US DETAILED SNGL FETUS: CPT

## 2025-06-19 NOTE — ASSESSMENT & PLAN NOTE
Patient referred for pregnancy complicated by history of a DVT.  Patient had a DVT noted in her calf shortly after abdominal surgery for ovarian cyst.  I believe it is equivocal as to the need for Lovenox during this pregnancy.  There is a reasonable chance that the DVT was related to trauma immobility due to surgery.  I discussed the risks and benefits of Lovenox therapy with the patient and the patient declines taking Lovenox.  We discussed the signs and symptoms of DVT and pulmonary embolism and the patient will contact her provider immediately if she notices any of these complications.  I did recommend that she continue her low-dose aspirin.    We will rescan the patient again in 8 weeks time.

## 2025-06-19 NOTE — PROGRESS NOTES
"    OB FOLLOW UP  CC- Here for care of pregnancy        Nataliia Lr is a 25 y.o.  20w0d patient being seen today for her obstetrical follow up visit. Patient reports itching and rash on the backs of her hands. Patient states that this occurs each summer.      Her prenatal care is complicated by (and status) : see below.  Patient Active Problem List   Diagnosis    Right tubo-ovarian mass    Sciatica of left side    History of abnormal cervical Pap smear    History of DVT (deep vein thrombosis)    Uterine anomaly    History of postneonatal death of child    Obesity in pregnancy, antepartum    Subchorionic hemorrhage of placenta in second trimester       Ultrasound Today: Yes with PDC. Per PDC- Size consistent with dates. No fetal anomalies were identified. No fetal markers for trisomy. The cervical length appears normal. Recommended repeat US with PDC in 8 weeks.\".  AFP was declined.    ROS -     Patient Denies: leaking of fluid, vaginal bleeding, dysuria, excessive vomiting, and more than 6 contractions per hour  Fetal Movement : Yes  Other than what is documented in the HPI, all other systems reviewed and are negative.       The additional following portions of the patient's history were reviewed and updated as appropriate: allergies, current medications, and problem list.      I have reviewed and agree with the HPI, ROS, and historical information as entered above. Vickie Norwood MD      /62   Wt 89.4 kg (197 lb)   LMP 2025 (Exact Date)   BMI 33.81 kg/m²       EXAM:     Prenatal Vitals  BP: 110/62  Weight: 89.4 kg (197 lb)              Urine Glucose Read-only: Negative  Urine Protein Read-only: Negative       Assessment and Plan    Problem List Items Addressed This Visit          Coag and Thromboembolic    History of DVT (deep vein thrombosis)    Overview   2023 status post ovarian cystectomy with Dr. Hector.  5 days later developed a postoperative DVT.  Thrombophilia workup " was negative.  Status post Eliquis for 3 months.  Follows with Dr. Merrill.   Recommend prophylactic Lovenox.  Discussed with Dr. Trejo.  Patient reluctant to take Lovenox and would like to stick with baby aspirin for now.  She will discuss with PDC when she comes in for appointment.  She understands the risk of clot in pregnancy.  3/13/2024-[facility] recs We would recommend delivery of the patient at 38 weeks gestation through a heparin window. If the patient continues on Lovenox she would need to be off for 24 hours prior to delivery. If she is switched to unfractionated heparin she would need to be off for at least 8 hours after delivery. We would restart anticoagulation 8 hours after delivery and would continue it for 2 to 3 months postpartum   2025- PDC recommended Lovenox injections from conception until 6 weeks PP         Relevant Orders    POC Urinalysis Dipstick (Completed)       Genitourinary and Reproductive     Uterine anomaly    Overview   Bicornuate uterus         Relevant Orders    POC Urinalysis Dipstick (Completed)       Gravid and     Obesity in pregnancy, antepartum - Primary    Overview   [ ] ASA 81MG 12 weeks until delivery (if indicated)  [ ] HgB A1C at initial prenatal  [ ] 1 hour glucola at 12 weeks if HgB A1C elevated at initial prenatal          Relevant Orders    POC Urinalysis Dipstick (Completed)    Subchorionic hemorrhage of placenta in second trimester    Overview    confirmed SIUP, marginal previa, MARISELA 22.6mm x 14mm x 14.2mm   Pelvic rest         Relevant Orders    POC Urinalysis Dipstick (Completed)       Other    History of postneonatal death of child    Overview   P C/S at 39 weeks at  for persistent variables. Baby passed shortly after delivery. Baby's autopsy showed normal male karyotype. Placental pathology showed necrotizing chorioamnionitis, large fetal vessel thrombosis thrombosis extending to the umbilical vein and multiple chorionic vessels, and  concern for possible abruption. Patient stopped Lovenox at 38 weeks.          Relevant Orders    POC Urinalysis Dipstick (Completed)       Pregnancy at 20w0d  Anatomy scan today is complete and appear within normal limits.  Fetal status reassuring.   Activity and Exercise discussed.  Patient is on Prenatal vitamins  Return in about 4 weeks (around 7/17/2025).      Vickie Norwood MD  06/19/2025

## 2025-06-19 NOTE — LETTER
2025     Vickie Norwood MD  [address]  [place] [address]    Patient: Nataliia Lr   YOB: 2000   Date of Visit: 2025     Dear Vickie Norwood MD:       Thank you for referring Nataliia Lr to me for evaluation. Below are the relevant portions of my assessment and plan of care.    If you have questions, please do not hesitate to call me. I look forward to following Nataliia along with you.         Sincerely,        Douglas A. Milligan, MD        CC: No Recipients    Milligan, Douglas A, MD  [phone]  Sign when Signing Visit  Documentation of the ultrasound findings, images, and interpretations will be available in the patient's Viewpoint report which is located in the imaging tab in chart review.    Maternal/Fetal Medicine Consult Note     Name: Nataliia Lr    : 2000     MRN: [id number]     Referring Provider: Vickie Norwood MD    Chief Complaint  HX abruption with NND, bicornuate uterus, prev C/S short in    Subjective     History of Present Illness:  Nataliia Lr is a 25 y.o.  20w0d who presents today for Hx abruption    CRISTINE: Estimated Date of Delivery: 25     ROS:   As noted in HPI.     Past Medical History:   Diagnosis Date   • Abnormal Pap smear of cervix    • Acute deep vein thrombosis (DVT) of calf muscle vein of left lower extremity 2023    Post-op after right cystectomy in   Follows with hematologist Dr. Merrill      • Anticardiolipin antibody positive 2023   • COVID-19    • History of DVT of lower extremity 2023    post-operative- lower left calf - after ovarian cyst surgery   • History of dysmenorrhea    • History of endometriosis    • History of menorrhagia    • History of ovarian cyst     right endometriotic cyst   • Placenta previa in second trimester 2025 confirmed SIUP, marginal previa, MARISELA 22.6mm x 14mm x 14.2mm   Pelvic rest  PDC referral        Past Surgical History:   Procedure  "Laterality Date   •  SECTION  2024     shortly after birth   • OVARIAN CYST SURGERY Right 2023    Procedure: OVARIAN CYSTECTOMY LAPAROSCOPIC WITH DAVINCI ROBOT RIGHT, PLACEMENT OF ADHESION BARRIER;  Surgeon: Tejal Hector MD;  Location: [facility];  Service: Robotics - DaVinci;  Laterality: Right;   • WISDOM TOOTH EXTRACTION        OB History          2    Para   1    Term   1       0    AB   0    Living   0         SAB   0    IAB   0    Ectopic   0    Molar   0    Multiple   0    Live Births   1          Obstetric Comments   Fob #1 - Pregnancy #1 and #2               Objective     Vital Signs  /58   Wt 89.4 kg (197 lb)   LMP 2025 (Exact Date)   Estimated body mass index is 33.81 kg/m² as calculated from the following:    Height as of 25: 162.6 cm (64\").    Weight as of this encounter: 89.4 kg (197 lb).    Physical Exam    Ultrasound Impression:   See Viewpoint    Assessment and Plan     Nataliia Lr is a 25 y.o.  20w0d who presents today for Hx abruption    Diagnoses and all orders for this visit:    1. History of postneonatal death of child (Primary)  -     [facility]; Future    2. Uterine anomaly  -     [facility]; Future    3. History of DVT (deep vein thrombosis)  Assessment & Plan:  Patient referred for pregnancy complicated by history of a DVT.  Patient had a DVT noted in her calf shortly after abdominal surgery for ovarian cyst.  I believe it is equivocal as to the need for Lovenox during this pregnancy.  There is a reasonable chance that the DVT was related to trauma immobility due to surgery.  I discussed the risks and benefits of Lovenox therapy with the patient and the patient declines taking Lovenox.  We discussed the signs and symptoms of DVT and pulmonary embolism and the patient will contact her provider immediately if she notices any of these complications.  I did recommend that she " continue her low-dose aspirin.    We will rescan the patient again in 8 weeks time.    Orders:  -     [facility] [place]; Future         Follow Up  Return in about 8 weeks (around 2025).    I spent 20 minutes caring for the patient on the day of service. This included: obtaining or reviewing a separately obtained medical history, reviewing patient records, performing a medically appropriate exam and/or evaluation, counseling or educating the patient/family/caregiver, ordering medications, labs, and/or procedures and documenting such in the medical record. This does not include time spent on review and interpretation of other tests such as fetal ultrasound or the performance of other procedures such as amniocentesis or CVS.      Douglas A. Milligan, MD  Maternal Fetal Medicine, [facility] [place]     2025

## 2025-06-19 NOTE — PROGRESS NOTES
Documentation of the ultrasound findings, images, and interpretations will be available in the patient's Viewpoint report which is located in the imaging tab in chart review.    Maternal/Fetal Medicine Consult Note     Name: Nataliia Lr    : 2000     MRN: [id number]     Referring Provider: Vickie Norwood MD    Chief Complaint  HX abruption with NND, bicornuate uterus, prev C/S short in    Subjective     History of Present Illness:  Nataliia Lr is a 25 y.o.  20w0d who presents today for Hx abruption    CRISTINE: Estimated Date of Delivery: 25     ROS:   As noted in HPI.     Past Medical History:   Diagnosis Date    Abnormal Pap smear of cervix     Acute deep vein thrombosis (DVT) of calf muscle vein of left lower extremity 2023    Post-op after right cystectomy in   Follows with hematologist Dr. Merrill       Anticardiolipin antibody positive 2023    COVID-19     History of DVT of lower extremity 2023    post-operative- lower left calf - after ovarian cyst surgery    History of dysmenorrhea     History of endometriosis     History of menorrhagia     History of ovarian cyst     right endometriotic cyst    Placenta previa in second trimester 2025 confirmed SIUP, marginal previa, MARISELA 22.6mm x 14mm x 14.2mm   Pelvic rest  PDC referral        Past Surgical History:   Procedure Laterality Date     SECTION  2024     shortly after birth    OVARIAN CYST SURGERY Right 2023    Procedure: OVARIAN CYSTECTOMY LAPAROSCOPIC WITH DAVINCI ROBOT RIGHT, PLACEMENT OF ADHESION BARRIER;  Surgeon: Tejal Hector MD;  Location: [facility];  Service: Robotics - DaVinci;  Laterality: Right;    WISDOM TOOTH EXTRACTION        OB History          2    Para   1    Term   1       0    AB   0    Living   0         SAB   0    IAB   0    Ectopic   0    Molar   0    Multiple   0    Live Births   1          Obstetric Comments   Fob #1 - Pregnancy #1 and  "#2               Objective     Vital Signs  /58   Wt 89.4 kg (197 lb)   LMP 2025 (Exact Date)   Estimated body mass index is 33.81 kg/m² as calculated from the following:    Height as of 25: 162.6 cm (64\").    Weight as of this encounter: 89.4 kg (197 lb).    Physical Exam    Ultrasound Impression:   See Viewpoint    Assessment and Plan     Nataliia Lr is a 25 y.o.  20w0d who presents today for Hx abruption    Diagnoses and all orders for this visit:    1. History of postneonatal death of child (Primary)  -     [facility]; Future    2. Uterine anomaly  -     [facility]; Future    3. History of DVT (deep vein thrombosis)  Assessment & Plan:  Patient referred for pregnancy complicated by history of a DVT.  Patient had a DVT noted in her calf shortly after abdominal surgery for ovarian cyst.  I believe it is equivocal as to the need for Lovenox during this pregnancy.  There is a reasonable chance that the DVT was related to trauma immobility due to surgery.  I discussed the risks and benefits of Lovenox therapy with the patient and the patient declines taking Lovenox.  We discussed the signs and symptoms of DVT and pulmonary embolism and the patient will contact her provider immediately if she notices any of these complications.  I did recommend that she continue her low-dose aspirin.    We will rescan the patient again in 8 weeks time.    Orders:  -     [facility]; Future         Follow Up  Return in about 8 weeks (around 2025).    I spent 20 minutes caring for the patient on the day of service. This included: obtaining or reviewing a separately obtained medical history, reviewing patient records, performing a medically appropriate exam and/or evaluation, counseling or educating the patient/family/caregiver, ordering medications, labs, and/or procedures and documenting such in the medical record. This does not " include time spent on review and interpretation of other tests such as fetal ultrasound or the performance of other procedures such as amniocentesis or CVS.      Douglas A. Milligan, MD  Maternal Fetal Medicine, [facility]     2025

## 2025-06-19 NOTE — PROGRESS NOTES
Patient denies bleeding, leaking fluid or contractions  NIPT negative  Patients next follow up with Dr. Norwood office is today

## 2025-07-10 ENCOUNTER — TELEPHONE (OUTPATIENT)
Dept: OBSTETRICS AND GYNECOLOGY | Facility: CLINIC | Age: 25
End: 2025-07-10
Payer: COMMERCIAL

## 2025-07-10 DIAGNOSIS — Z86.718 HISTORY OF DVT (DEEP VEIN THROMBOSIS): ICD-10-CM

## 2025-07-10 DIAGNOSIS — O46.8X2 SUBCHORIONIC HEMORRHAGE OF PLACENTA IN SECOND TRIMESTER: ICD-10-CM

## 2025-07-10 DIAGNOSIS — Z34.92 PRENATAL CARE IN SECOND TRIMESTER, UNSPECIFIED GRAVIDITY: ICD-10-CM

## 2025-07-10 DIAGNOSIS — Q51.9 UTERINE ANOMALY: ICD-10-CM

## 2025-07-10 DIAGNOSIS — Z84.89 HISTORY OF POSTNEONATAL DEATH OF CHILD: Primary | ICD-10-CM

## 2025-07-10 NOTE — TELEPHONE ENCOUNTER
"Pt sent this message over [employer] this morning and is calling for an update       \"Hi!     I wanted to send a message requesting to do an ultrasound the day of my appointment on July 17th? My anxiety has been through the roof wanting to make sure the baby is okay.     Thank you\"  "

## 2025-07-10 NOTE — TELEPHONE ENCOUNTER
Pt's next appt on 7/17 with Dr. Norwood and PDC appt on 8/14. Pt states she would like an US added on to her appt on 7/17 due to her history. Per Dr. Norwood, okay to add on US. Ingrid carlson added on US. Pt notified and she v/u.

## 2025-07-17 ENCOUNTER — ROUTINE PRENATAL (OUTPATIENT)
Dept: OBSTETRICS AND GYNECOLOGY | Facility: CLINIC | Age: 25
End: 2025-07-17
Payer: COMMERCIAL

## 2025-07-17 VITALS — DIASTOLIC BLOOD PRESSURE: 72 MMHG | BODY MASS INDEX: 34.33 KG/M2 | SYSTOLIC BLOOD PRESSURE: 114 MMHG | WEIGHT: 200 LBS

## 2025-07-17 DIAGNOSIS — Z3A.24 24 WEEKS GESTATION OF PREGNANCY: ICD-10-CM

## 2025-07-17 DIAGNOSIS — Z34.92 PRENATAL CARE, SECOND TRIMESTER: Primary | ICD-10-CM

## 2025-07-17 LAB
EXPIRATION DATE: 0
GLUCOSE UR STRIP-MCNC: NEGATIVE MG/DL
Lab: 0
PROT UR STRIP-MCNC: NEGATIVE MG/DL

## 2025-07-17 NOTE — PROGRESS NOTES
OB FOLLOW UP  CC- Here for care of pregnancy        Nataliia Lr is a 25 y.o.  24w0d patient being seen today for her obstetrical follow up visit. Patient reports trace BUE/BLE edema and daily fresh blood from the nasal cavity with cleaning her nose (present 1.5 weeks; denies a bleed/leaking).     Her prenatal care is complicated by (and status): see below.  Patient Active Problem List   Diagnosis    Right tubo-ovarian mass    Sciatica of left side    History of abnormal cervical Pap smear    History of DVT (deep vein thrombosis)    Uterine anomaly    History of postneonatal death of child    Obesity in pregnancy, antepartum    Subchorionic hemorrhage of placenta in second trimester       Flu Status: not currently flu season  Ultrasound Today: Yes  Reviewed 1 hr glucose testing and TDAP next visit.    ROS -   Patient Denies: leaking of fluid, vaginal bleeding, dysuria, excessive vomiting, and more than 6 contractions per hour  Fetal Movement: normal  Other than what is documented in the HPI, all other systems reviewed and are negative.       The additional following portions of the patient's history were reviewed and updated as appropriate: allergies, current medications, past family history, past medical history, past social history, past surgical history, and problem list.      I have reviewed and agree with the HPI, ROS, and historical information as entered above. Vickie Norwood MD      /72   Wt 90.7 kg (200 lb)   LMP 2025 (Exact Date)   BMI 34.33 kg/m²       EXAM:     Prenatal Vitals  BP: 114/72  Weight: 90.7 kg (200 lb)   Fetal Heart Rate: 151 U/S               Urine Glucose Read-only: Negative  Urine Protein Read-only: Negative       Assessment and Plan    Problem List Items Addressed This Visit    None  Visit Diagnoses         Prenatal care, second trimester    -  Primary    Relevant Orders    POC Protein, Urine, Qualitative, Dipstick (Completed)    POC Glucose, Urine,  Qualitative, Dipstick (Completed)      24 weeks gestation of pregnancy        Relevant Orders    POC Protein, Urine, Qualitative, Dipstick (Completed)    POC Glucose, Urine, Qualitative, Dipstick (Completed)            Pregnancy at 24w0d  Fetal status reassuring.  U/S today normal fluid.  Vertex.  1 hour gtt, CBC, Antibody screen, TDAP, and RPR next visit. Instructions given  Discussed/encouraged TDAP vaccination after 28 weeks  Activity and Exercise discussed.  Enc saline nasal spray, flonase if desires.  Return in about 4 weeks (around 8/14/2025) for One hour glucola.      Vickie Norwood MD  07/17/2025

## 2025-07-30 ENCOUNTER — TELEPHONE (OUTPATIENT)
Dept: OBSTETRICS AND GYNECOLOGY | Facility: CLINIC | Age: 25
End: 2025-07-30
Payer: COMMERCIAL

## 2025-07-30 NOTE — TELEPHONE ENCOUNTER
Patient of Dr. Norwood;  @ 25w 6d. LOV 25; NOV 25.  Returned patient's call.   States she just returned from vacation and developed symptoms on .  Reports sinus pressure; congestion; H/A; sore throat; and productive cough. Has not had a fever. Denies any body aches today.   Discussed that she can try plain Mucinex, plain Robitussin, cough drops, Zyrtec or Claritin, Benadryl, Chlorpheniramine, or Flonase nasal spray. Sudafed may be used after the first trimester but recommend trying other remedies first.   Increase water intake; can use a humidifier also.  Encouraged patient to see her PCP or go to [facility] for evaluation. Instructed to call back for additional instructions if she should test positive for Covid.  Patient v/u and agreed.

## 2025-07-30 NOTE — TELEPHONE ENCOUNTER
Provider: Vickie Norwood MD     Caller: Nataliia Lr    Relationship to Patient: Self    Pharmacy:     Phone Number:[phone]    Reason for Call: PATIENT CALLING BECAUSE SHE JUST GOT BACK FROM VACATION SHE ISNT FEELING WELL. CONGESTED, SORE THROAT AND POUNDING HEAD ACHE WONDERING IF THERE IS ANYTHING THAT CAN BE SENT IN FOR HER

## 2025-07-30 NOTE — TELEPHONE ENCOUNTER
See previous phone encounter from today.  Returned patient's call.    Advised her she can take Tylenol, Mucinex, and Robitussin for symptom relief. Encouraged to stay well hydrated.   Advised her that the following are recommended for COVID during pregnancy:   Zinc 50 mg once daily until symptoms resolve  Vitamin C 500 mg once daily until symptoms resolve  Vitamin D 1,000 IU once daily until symptoms resolve  Continue ASA 81 mg once daily until delivery  She v/u and agreed. Discussed Paxlovid; she declined at this time.

## 2025-07-30 NOTE — TELEPHONE ENCOUNTER
Caller: Nataliia Lr    Relationship: Self    Best call back number:   Telephone Information:   Mobile [phone]        What is the best time to reach you: ANY    Who are you requesting to speak with (clinical staff, provider,  specific staff member): CLINICAL       What was the call regarding: PT STATES SHE IS JUST CALLING TO LET DR LOUISE KNOW THAT SHE DID TEST POSITIVE OR COVID.

## 2025-07-31 ENCOUNTER — TELEPHONE (OUTPATIENT)
Dept: OBSTETRICS AND GYNECOLOGY | Facility: CLINIC | Age: 25
End: 2025-07-31
Payer: COMMERCIAL

## 2025-07-31 DIAGNOSIS — U07.1 COVID-19 AFFECTING PREGNANCY IN SECOND TRIMESTER: Primary | ICD-10-CM

## 2025-07-31 DIAGNOSIS — O98.512 COVID-19 AFFECTING PREGNANCY IN SECOND TRIMESTER: Primary | ICD-10-CM

## 2025-07-31 NOTE — TELEPHONE ENCOUNTER
Called patient she stated that she was diagnosed yesterday with covid. Took vitamins that were suggested but vomited them up. Encouraged her to continue baby aspirin 81 mg daily and zofran as needed. Will rx paxlovid as well. +FM She VU

## 2025-07-31 NOTE — TELEPHONE ENCOUNTER
Caller: Nataliia Lr    Relationship to patient: Self    Best call back number: [phone]     Patient is needing: PT IS 24 WEEKS, WAS JUST DX WITH COVID. HAS QUESTIONS ABOUT MEDICATION SHE IS ON.

## 2025-08-13 ENCOUNTER — TELEPHONE (OUTPATIENT)
Dept: OBSTETRICS AND GYNECOLOGY | Facility: CLINIC | Age: 25
End: 2025-08-13
Payer: COMMERCIAL

## 2025-08-14 ENCOUNTER — ROUTINE PRENATAL (OUTPATIENT)
Dept: OBSTETRICS AND GYNECOLOGY | Facility: CLINIC | Age: 25
End: 2025-08-14
Payer: COMMERCIAL

## 2025-08-14 ENCOUNTER — OFFICE VISIT (OUTPATIENT)
Dept: OBSTETRICS AND GYNECOLOGY | Facility: HOSPITAL | Age: 25
End: 2025-08-14
Payer: COMMERCIAL

## 2025-08-14 ENCOUNTER — HOSPITAL ENCOUNTER (OUTPATIENT)
Dept: WOMENS IMAGING | Facility: HOSPITAL | Age: 25
Discharge: HOME | End: 2025-08-14
Admitting: OBSTETRICS & GYNECOLOGY
Payer: COMMERCIAL

## 2025-08-14 VITALS — WEIGHT: 203.4 LBS | BODY MASS INDEX: 34.91 KG/M2 | DIASTOLIC BLOOD PRESSURE: 52 MMHG | SYSTOLIC BLOOD PRESSURE: 108 MMHG

## 2025-08-14 VITALS — WEIGHT: 204 LBS | DIASTOLIC BLOOD PRESSURE: 62 MMHG | BODY MASS INDEX: 35.02 KG/M2 | SYSTOLIC BLOOD PRESSURE: 104 MMHG

## 2025-08-14 DIAGNOSIS — Z3A.28 28 WEEKS GESTATION OF PREGNANCY: Primary | ICD-10-CM

## 2025-08-14 DIAGNOSIS — Z84.89 HISTORY OF POSTNEONATAL DEATH OF CHILD: ICD-10-CM

## 2025-08-14 DIAGNOSIS — Z86.718 HISTORY OF DVT (DEEP VEIN THROMBOSIS): ICD-10-CM

## 2025-08-14 DIAGNOSIS — O99.210 OBESITY IN PREGNANCY, ANTEPARTUM: ICD-10-CM

## 2025-08-14 DIAGNOSIS — Q51.9 UTERINE ANOMALY: ICD-10-CM

## 2025-08-14 DIAGNOSIS — Z84.89 HISTORY OF POSTNEONATAL DEATH OF CHILD: Primary | ICD-10-CM

## 2025-08-14 DIAGNOSIS — Z34.83 PRENATAL CARE, SUBSEQUENT PREGNANCY IN THIRD TRIMESTER: ICD-10-CM

## 2025-08-14 PROCEDURE — 76816 OB US FOLLOW-UP PER FETUS: CPT

## 2025-08-15 LAB
BLD GP AB SCN SERPL QL: NEGATIVE
ERYTHROCYTE [DISTWIDTH] IN BLOOD BY AUTOMATED COUNT: 13 % (ref 12.3–15.4)
GLUCOSE 1H P 50 G GLC PO SERPL-MCNC: 118 MG/DL (ref 65–139)
HCT VFR BLD AUTO: 36.2 % (ref 34–46.6)
HGB BLD-MCNC: 11.8 G/DL (ref 12–15.9)
MCH RBC QN AUTO: 30.7 PG (ref 26.6–33)
MCHC RBC AUTO-ENTMCNC: 32.6 G/DL (ref 31.5–35.7)
MCV RBC AUTO: 94.3 FL (ref 79–97)
PLATELET # BLD AUTO: 158 10*3/MM3 (ref 140–450)
RBC # BLD AUTO: 3.84 10*6/MM3 (ref 3.77–5.28)
RPR SER QL: NON REACTIVE
WBC # BLD AUTO: 11.97 10*3/MM3 (ref 3.4–10.8)

## 2025-08-28 ENCOUNTER — ROUTINE PRENATAL (OUTPATIENT)
Dept: OBSTETRICS AND GYNECOLOGY | Facility: CLINIC | Age: 25
End: 2025-08-28
Payer: COMMERCIAL

## 2025-08-28 VITALS — SYSTOLIC BLOOD PRESSURE: 112 MMHG | BODY MASS INDEX: 35.36 KG/M2 | WEIGHT: 206 LBS | DIASTOLIC BLOOD PRESSURE: 62 MMHG

## 2025-08-28 DIAGNOSIS — Z84.89 HISTORY OF POSTNEONATAL DEATH OF CHILD: ICD-10-CM

## 2025-08-28 DIAGNOSIS — Z34.83 PRENATAL CARE, SUBSEQUENT PREGNANCY IN THIRD TRIMESTER: Primary | ICD-10-CM

## 2025-08-28 DIAGNOSIS — Z3A.30 30 WEEKS GESTATION OF PREGNANCY: ICD-10-CM

## 2025-08-28 DIAGNOSIS — O99.210 OBESITY IN PREGNANCY, ANTEPARTUM: ICD-10-CM

## 2025-08-28 DIAGNOSIS — Q51.9 UTERINE ANOMALY: ICD-10-CM

## 2025-08-28 DIAGNOSIS — Z86.718 HISTORY OF DVT (DEEP VEIN THROMBOSIS): ICD-10-CM

## 2025-08-28 LAB
GLUCOSE UR STRIP-MCNC: NEGATIVE MG/DL
PROT UR STRIP-MCNC: NEGATIVE MG/DL